# Patient Record
Sex: MALE | Race: WHITE | NOT HISPANIC OR LATINO | Employment: OTHER | URBAN - METROPOLITAN AREA
[De-identification: names, ages, dates, MRNs, and addresses within clinical notes are randomized per-mention and may not be internally consistent; named-entity substitution may affect disease eponyms.]

---

## 2017-06-12 ENCOUNTER — HOSPITAL ENCOUNTER (OUTPATIENT)
Dept: MEDSURG UNIT | Facility: HOSPITAL | Age: 57
End: 2017-06-15
Attending: INTERNAL MEDICINE | Admitting: INTERNAL MEDICINE

## 2017-06-12 LAB
ABS NEUT (OLG): 5.6 X10(3)/MCL (ref 1.5–6.9)
ALBUMIN SERPL-MCNC: 4.2 GM/DL (ref 3.4–5)
ALBUMIN/GLOB SERPL: 1 RATIO
ALP SERPL-CCNC: 92 UNIT/L (ref 30–113)
ALT SERPL-CCNC: 14 UNIT/L (ref 10–45)
APPEARANCE, UA: CLEAR
APTT PPP: 25.7 SECOND(S) (ref 25–35)
AST SERPL-CCNC: 10 UNIT/L (ref 15–37)
BASOPHILS # BLD AUTO: 0.1 X10(3)/MCL (ref 0–0.1)
BASOPHILS NFR BLD AUTO: 1 % (ref 0–1)
BILIRUB SERPL-MCNC: 1.3 MG/DL (ref 0.1–0.9)
BILIRUB UR QL STRIP: NEGATIVE
BILIRUBIN DIRECT+TOT PNL SERPL-MCNC: 0.2 MG/DL (ref 0–0.3)
BILIRUBIN DIRECT+TOT PNL SERPL-MCNC: 1.1 MG/DL
BUN SERPL-MCNC: 30 MG/DL (ref 10–20)
CALCIUM SERPL-MCNC: 9.9 MG/DL (ref 8–10.5)
CHLORIDE SERPL-SCNC: 101 MMOL/L (ref 100–108)
CK MB SERPL-MCNC: 0.7 NG/ML (ref 0–3.6)
CK MB SERPL-MCNC: 0.7 NG/ML (ref 0–3.6)
CK MB SERPL-MCNC: 1.2 NG/ML (ref 0–3.6)
CK SERPL-CCNC: 33 UNIT/L (ref 39–225)
CK SERPL-CCNC: 50 UNIT/L (ref 39–225)
CK SERPL-CCNC: 63 UNIT/L (ref 39–225)
CO2 SERPL-SCNC: 30 MMOL/L (ref 21–35)
COLOR UR: NORMAL
CREAT SERPL-MCNC: 1.24 MG/DL (ref 0.7–1.3)
EOSINOPHIL # BLD AUTO: 0.1 X10(3)/MCL (ref 0–0.6)
EOSINOPHIL NFR BLD AUTO: 1 % (ref 0–5)
ERYTHROCYTE [DISTWIDTH] IN BLOOD BY AUTOMATED COUNT: 12.5 % (ref 11.5–17)
GLOBULIN SER-MCNC: 4.3 GM/DL
GLUCOSE (UA): NEGATIVE
GLUCOSE SERPL-MCNC: 111 MG/DL (ref 75–116)
HCT VFR BLD AUTO: 44.2 % (ref 42–52)
HGB BLD-MCNC: 15.4 GM/DL (ref 14–18)
HGB UR QL STRIP: NEGATIVE
INR PPP: 1 (ref 0–1.2)
KETONES UR QL STRIP: NEGATIVE
LEUKOCYTE ESTERASE UR QL STRIP: NEGATIVE
LYMPHOCYTES # BLD AUTO: 1.5 X10(3)/MCL (ref 0.5–4.1)
LYMPHOCYTES NFR BLD AUTO: 19.4 % (ref 15–40)
MCH RBC QN AUTO: 31 PG (ref 27–34)
MCHC RBC AUTO-ENTMCNC: 35 GM/DL (ref 31–36)
MCV RBC AUTO: 90 FL (ref 80–99)
MONOCYTES # BLD AUTO: 0.4 X10(3)/MCL (ref 0–1.1)
MONOCYTES NFR BLD AUTO: 5 % (ref 4–12)
NEUTROPHILS # BLD AUTO: 5.6 X10(3)/MCL (ref 1.5–6.9)
NEUTROPHILS NFR BLD AUTO: 73 % (ref 43–75)
NITRITE UR QL STRIP: NEGATIVE
PH UR STRIP: 5.5 [PH]
PLATELET # BLD AUTO: 270 X10(3)/MCL (ref 140–400)
PMV BLD AUTO: 10.3 FL (ref 6.8–10)
POTASSIUM SERPL-SCNC: 4.6 MMOL/L (ref 3.6–5.2)
PROT SERPL-MCNC: 8.5 GM/DL (ref 6.4–8.2)
PROT UR QL STRIP: NEGATIVE
PROTHROMBIN TIME: 10.7 SECOND(S) (ref 9–12)
RBC # BLD AUTO: 4.9 X10(6)/MCL (ref 4.7–6.1)
SODIUM SERPL-SCNC: 139 MMOL/L (ref 135–145)
SP GR UR STRIP: >=1.03
TROPONIN I SERPL-MCNC: <0.017 NG/ML (ref 0–0.06)
TSH SERPL-ACNC: 6.85 MIU/ML (ref 0.36–3.74)
UROBILINOGEN UR STRIP-ACNC: 0.2 EU/DL
WBC # SPEC AUTO: 7.7 X10(3)/MCL (ref 4.5–11.5)

## 2017-06-13 LAB
ABS NEUT (OLG): 3.5 X10(3)/MCL (ref 1.5–6.9)
ALBUMIN SERPL-MCNC: 3.1 GM/DL (ref 3.4–5)
ALBUMIN/GLOB SERPL: 0.9 RATIO
ALP SERPL-CCNC: 93 UNIT/L (ref 30–113)
ALT SERPL-CCNC: 17 UNIT/L (ref 10–45)
AST SERPL-CCNC: 14 UNIT/L (ref 15–37)
BASOPHILS # BLD AUTO: 0 X10(3)/MCL (ref 0–0.1)
BASOPHILS NFR BLD AUTO: 1 % (ref 0–1)
BILIRUB SERPL-MCNC: 1.4 MG/DL (ref 0.1–0.9)
BILIRUBIN DIRECT+TOT PNL SERPL-MCNC: 0.3 MG/DL (ref 0–0.3)
BILIRUBIN DIRECT+TOT PNL SERPL-MCNC: 1.1 MG/DL
BUN SERPL-MCNC: 22 MG/DL (ref 10–20)
CALCIUM SERPL-MCNC: 7.9 MG/DL (ref 8–10.5)
CHLORIDE SERPL-SCNC: 108 MMOL/L (ref 100–108)
CK MB SERPL-MCNC: <0.5 NG/ML (ref 0–3.6)
CK SERPL-CCNC: 29 UNIT/L (ref 39–225)
CO2 SERPL-SCNC: 28 MMOL/L (ref 21–35)
CREAT SERPL-MCNC: 0.77 MG/DL (ref 0.7–1.3)
EOSINOPHIL # BLD AUTO: 0.1 X10(3)/MCL (ref 0–0.6)
EOSINOPHIL NFR BLD AUTO: 2 % (ref 0–5)
ERYTHROCYTE [DISTWIDTH] IN BLOOD BY AUTOMATED COUNT: 12.4 % (ref 11.5–17)
GLOBULIN SER-MCNC: 3.3 GM/DL
GLUCOSE SERPL-MCNC: 86 MG/DL (ref 75–116)
HCT VFR BLD AUTO: 37.7 % (ref 42–52)
HGB BLD-MCNC: 12.9 GM/DL (ref 14–18)
LYMPHOCYTES # BLD AUTO: 1 X10(3)/MCL (ref 0.5–4.1)
LYMPHOCYTES NFR BLD AUTO: 20.5 % (ref 15–40)
MAGNESIUM SERPL-MCNC: 1.5 MG/DL (ref 1.8–2.4)
MCH RBC QN AUTO: 31 PG (ref 27–34)
MCHC RBC AUTO-ENTMCNC: 34 GM/DL (ref 31–36)
MCV RBC AUTO: 91 FL (ref 80–99)
MONOCYTES # BLD AUTO: 0.3 X10(3)/MCL (ref 0–1.1)
MONOCYTES NFR BLD AUTO: 6 % (ref 4–12)
NEUTROPHILS # BLD AUTO: 3.5 X10(3)/MCL (ref 1.5–6.9)
NEUTROPHILS NFR BLD AUTO: 70 % (ref 43–75)
PHOSPHATE SERPL-MCNC: 2.5 MG/DL (ref 2.6–4.7)
PLATELET # BLD AUTO: 181 X10(3)/MCL (ref 140–400)
PMV BLD AUTO: 10.2 FL (ref 6.8–10)
POTASSIUM SERPL-SCNC: 3.8 MMOL/L (ref 3.6–5.2)
PROT SERPL-MCNC: 6.4 GM/DL (ref 6.4–8.2)
RBC # BLD AUTO: 4.14 X10(6)/MCL (ref 4.7–6.1)
SODIUM SERPL-SCNC: 141 MMOL/L (ref 135–145)
TROPONIN I SERPL-MCNC: <0.017 NG/ML (ref 0–0.06)
WBC # SPEC AUTO: 5 X10(3)/MCL (ref 4.5–11.5)

## 2017-06-14 LAB
ABS NEUT (OLG): 3.9 X10(3)/MCL (ref 1.5–6.9)
ALBUMIN SERPL-MCNC: 3.1 GM/DL (ref 3.4–5)
ALBUMIN/GLOB SERPL: 0.9 RATIO
ALP SERPL-CCNC: 85 UNIT/L (ref 30–113)
ALT SERPL-CCNC: 12 UNIT/L (ref 10–45)
AST SERPL-CCNC: 11 UNIT/L (ref 15–37)
BASOPHILS # BLD AUTO: 0.1 X10(3)/MCL (ref 0–0.1)
BASOPHILS NFR BLD AUTO: 1 % (ref 0–1)
BILIRUB SERPL-MCNC: 1.7 MG/DL (ref 0.1–0.9)
BILIRUBIN DIRECT+TOT PNL SERPL-MCNC: 0.3 MG/DL (ref 0–0.3)
BILIRUBIN DIRECT+TOT PNL SERPL-MCNC: 1.4 MG/DL
BUN SERPL-MCNC: 12 MG/DL (ref 10–20)
CALCIUM SERPL-MCNC: 8.2 MG/DL (ref 8–10.5)
CHLORIDE SERPL-SCNC: 105 MMOL/L (ref 100–108)
CO2 SERPL-SCNC: 31 MMOL/L (ref 21–35)
CREAT SERPL-MCNC: 0.74 MG/DL (ref 0.7–1.3)
EOSINOPHIL # BLD AUTO: 0.1 X10(3)/MCL (ref 0–0.6)
EOSINOPHIL NFR BLD AUTO: 1 % (ref 0–5)
ERYTHROCYTE [DISTWIDTH] IN BLOOD BY AUTOMATED COUNT: 12.1 % (ref 11.5–17)
GLOBULIN SER-MCNC: 3.3 GM/DL
GLUCOSE SERPL-MCNC: 112 MG/DL (ref 75–116)
HCT VFR BLD AUTO: 37.8 % (ref 42–52)
HGB BLD-MCNC: 13.2 GM/DL (ref 14–18)
LYMPHOCYTES # BLD AUTO: 1 X10(3)/MCL (ref 0.5–4.1)
LYMPHOCYTES NFR BLD AUTO: 19.2 % (ref 15–40)
MAGNESIUM SERPL-MCNC: 1.3 MG/DL (ref 1.8–2.4)
MCH RBC QN AUTO: 31 PG (ref 27–34)
MCHC RBC AUTO-ENTMCNC: 35 GM/DL (ref 31–36)
MCV RBC AUTO: 89 FL (ref 80–99)
MONOCYTES # BLD AUTO: 0.3 X10(3)/MCL (ref 0–1.1)
MONOCYTES NFR BLD AUTO: 5 % (ref 4–12)
NEUTROPHILS # BLD AUTO: 3.9 X10(3)/MCL (ref 1.5–6.9)
NEUTROPHILS NFR BLD AUTO: 74 % (ref 43–75)
PHOSPHATE SERPL-MCNC: 2.7 MG/DL (ref 2.6–4.7)
PLATELET # BLD AUTO: 184 X10(3)/MCL (ref 140–400)
PMV BLD AUTO: 10.1 FL (ref 6.8–10)
POTASSIUM SERPL-SCNC: 3.8 MMOL/L (ref 3.6–5.2)
PROT SERPL-MCNC: 6.4 GM/DL (ref 6.4–8.2)
RBC # BLD AUTO: 4.24 X10(6)/MCL (ref 4.7–6.1)
SODIUM SERPL-SCNC: 139 MMOL/L (ref 135–145)
WBC # SPEC AUTO: 5.4 X10(3)/MCL (ref 4.5–11.5)

## 2017-06-15 LAB
ABS NEUT (OLG): 3.4 X10(3)/MCL (ref 1.5–6.9)
ALBUMIN SERPL-MCNC: 3 GM/DL (ref 3.4–5)
ALBUMIN/GLOB SERPL: 0.9 RATIO
ALP SERPL-CCNC: 80 UNIT/L (ref 30–113)
ALT SERPL-CCNC: 12 UNIT/L (ref 10–45)
AST SERPL-CCNC: 11 UNIT/L (ref 15–37)
BASOPHILS # BLD AUTO: 0.1 X10(3)/MCL (ref 0–0.1)
BASOPHILS NFR BLD AUTO: 1 % (ref 0–1)
BILIRUB SERPL-MCNC: 1.2 MG/DL (ref 0.1–0.9)
BILIRUBIN DIRECT+TOT PNL SERPL-MCNC: 0.2 MG/DL (ref 0–0.3)
BILIRUBIN DIRECT+TOT PNL SERPL-MCNC: 1 MG/DL
BUN SERPL-MCNC: 11 MG/DL (ref 10–20)
CALCIUM SERPL-MCNC: 8 MG/DL (ref 8–10.5)
CHLORIDE SERPL-SCNC: 106 MMOL/L (ref 100–108)
CO2 SERPL-SCNC: 27 MMOL/L (ref 21–35)
CREAT SERPL-MCNC: 0.65 MG/DL (ref 0.7–1.3)
EOSINOPHIL # BLD AUTO: 0.1 X10(3)/MCL (ref 0–0.6)
EOSINOPHIL NFR BLD AUTO: 2 % (ref 0–5)
ERYTHROCYTE [DISTWIDTH] IN BLOOD BY AUTOMATED COUNT: 12.1 % (ref 11.5–17)
GLOBULIN SER-MCNC: 3.4 GM/DL
GLUCOSE SERPL-MCNC: 91 MG/DL (ref 75–116)
HCT VFR BLD AUTO: 36.5 % (ref 42–52)
HGB BLD-MCNC: 12.8 GM/DL (ref 14–18)
LYMPHOCYTES # BLD AUTO: 1 X10(3)/MCL (ref 0.5–4.1)
LYMPHOCYTES NFR BLD AUTO: 21.1 % (ref 15–40)
MAGNESIUM SERPL-MCNC: 1.7 MG/DL (ref 1.8–2.4)
MCH RBC QN AUTO: 31 PG (ref 27–34)
MCHC RBC AUTO-ENTMCNC: 35 GM/DL (ref 31–36)
MCV RBC AUTO: 89 FL (ref 80–99)
MONOCYTES # BLD AUTO: 0.3 X10(3)/MCL (ref 0–1.1)
MONOCYTES NFR BLD AUTO: 6 % (ref 4–12)
NEUTROPHILS # BLD AUTO: 3.4 X10(3)/MCL (ref 1.5–6.9)
NEUTROPHILS NFR BLD AUTO: 69 % (ref 43–75)
PHOSPHATE SERPL-MCNC: 2.3 MG/DL (ref 2.6–4.7)
PLATELET # BLD AUTO: 173 X10(3)/MCL (ref 140–400)
PMV BLD AUTO: 10.1 FL (ref 6.8–10)
POTASSIUM SERPL-SCNC: 3.3 MMOL/L (ref 3.6–5.2)
PROT SERPL-MCNC: 6.4 GM/DL (ref 6.4–8.2)
RBC # BLD AUTO: 4.11 X10(6)/MCL (ref 4.7–6.1)
SODIUM SERPL-SCNC: 140 MMOL/L (ref 135–145)
WBC # SPEC AUTO: 5 X10(3)/MCL (ref 4.5–11.5)

## 2017-09-18 ENCOUNTER — HISTORICAL (OUTPATIENT)
Dept: RADIOLOGY | Facility: HOSPITAL | Age: 57
End: 2017-09-18

## 2019-07-29 ENCOUNTER — HISTORICAL (OUTPATIENT)
Dept: RADIOLOGY | Facility: HOSPITAL | Age: 59
End: 2019-07-29

## 2019-08-23 LAB
ABS NEUT (OLG): 4.1 X10(3)/MCL (ref 1.5–6.9)
ALBUMIN SERPL-MCNC: 4 GM/DL (ref 3.4–5)
ALBUMIN/GLOB SERPL: 0.9 RATIO
ALP SERPL-CCNC: 88 UNIT/L (ref 30–113)
ALT SERPL-CCNC: 11 UNIT/L (ref 10–45)
APTT PPP: 27.5 SECOND(S) (ref 25–35)
AST SERPL-CCNC: 12 UNIT/L (ref 15–37)
BILIRUB SERPL-MCNC: 1.9 MG/DL (ref 0.1–0.9)
BILIRUBIN DIRECT+TOT PNL SERPL-MCNC: 0.3 MG/DL (ref 0–0.3)
BILIRUBIN DIRECT+TOT PNL SERPL-MCNC: 1.6 MG/DL
BUN SERPL-MCNC: 13 MG/DL (ref 10–20)
CALCIUM SERPL-MCNC: 8.9 MG/DL (ref 8–10.5)
CHLORIDE SERPL-SCNC: 102 MMOL/L (ref 100–108)
CO2 SERPL-SCNC: 29 MMOL/L (ref 21–35)
CREAT SERPL-MCNC: 1.14 MG/DL (ref 0.7–1.3)
ERYTHROCYTE [DISTWIDTH] IN BLOOD BY AUTOMATED COUNT: 12.3 % (ref 11.5–17)
GLOBULIN SER-MCNC: 4.4 GM/DL
GLUCOSE SERPL-MCNC: 89 MG/DL (ref 75–116)
HCT VFR BLD AUTO: 46.6 % (ref 42–52)
HGB BLD-MCNC: 15.4 GM/DL (ref 14–18)
INR PPP: 1 (ref 0–1.2)
MCH RBC QN AUTO: 31 PG (ref 27–34)
MCHC RBC AUTO-ENTMCNC: 33 GM/DL (ref 31–36)
MCV RBC AUTO: 94 FL (ref 80–99)
PLATELET # BLD AUTO: 185 X10(3)/MCL (ref 140–400)
PMV BLD AUTO: 9.9 FL (ref 6.8–10)
POTASSIUM SERPL-SCNC: 3.9 MMOL/L (ref 3.6–5.2)
PROT SERPL-MCNC: 8.4 GM/DL (ref 6.4–8.2)
PROTHROMBIN TIME: 10.2 SECOND(S) (ref 9–12)
RBC # BLD AUTO: 4.96 X10(6)/MCL (ref 4.7–6.1)
SODIUM SERPL-SCNC: 139 MMOL/L (ref 135–145)
WBC # SPEC AUTO: 5.9 X10(3)/MCL (ref 4.5–11.5)

## 2019-08-29 ENCOUNTER — HISTORICAL (OUTPATIENT)
Dept: ANESTHESIOLOGY | Facility: HOSPITAL | Age: 59
End: 2019-08-29

## 2019-08-29 LAB
APPEARANCE, UA: CLEAR
BACTERIA SPEC CULT: ABNORMAL /HPF
BILIRUB UR QL STRIP: NEGATIVE
COLOR UR: ABNORMAL
GLUCOSE (UA): NEGATIVE
HGB UR QL STRIP: ABNORMAL
KETONES UR QL STRIP: NEGATIVE
LEUKOCYTE ESTERASE UR QL STRIP: NEGATIVE
NITRITE UR QL STRIP: NEGATIVE
PH UR STRIP: 7.5 [PH]
PROT UR QL STRIP: NEGATIVE
RBC #/AREA URNS HPF: ABNORMAL /HPF
SP GR UR STRIP: 1.01
SQUAMOUS EPITHELIAL, UA: ABNORMAL /LPF
UROBILINOGEN UR STRIP-ACNC: 0.2 EU/DL
WBC #/AREA URNS HPF: ABNORMAL /HPF

## 2019-09-04 ENCOUNTER — HISTORICAL (OUTPATIENT)
Dept: RADIOLOGY | Facility: HOSPITAL | Age: 59
End: 2019-09-04

## 2019-09-11 ENCOUNTER — HISTORICAL (OUTPATIENT)
Dept: LAB | Facility: HOSPITAL | Age: 59
End: 2019-09-11

## 2019-09-11 LAB
ABS NEUT (OLG): 3.9 X10(3)/MCL (ref 1.5–6.9)
BASOPHILS # BLD AUTO: 0.1 X10(3)/MCL (ref 0–0.1)
BASOPHILS NFR BLD AUTO: 1 % (ref 0–1)
EOSINOPHIL # BLD AUTO: 0.2 X10(3)/MCL (ref 0–0.6)
EOSINOPHIL NFR BLD AUTO: 3 % (ref 0–5)
ERYTHROCYTE [DISTWIDTH] IN BLOOD BY AUTOMATED COUNT: 12.1 % (ref 11.5–17)
HCT VFR BLD AUTO: 40.3 % (ref 42–52)
HCV AB SERPL QL IA: NEGATIVE
HGB BLD-MCNC: 13.5 GM/DL (ref 14–18)
HIV 1+2 AB+HIV1 P24 AG SERPL QL IA: NEGATIVE
IMM GRANULOCYTES # BLD AUTO: 0.01 10*3/UL (ref 0–0.02)
IMM GRANULOCYTES NFR BLD AUTO: 0.2 % (ref 0–0.43)
LYMPHOCYTES # BLD AUTO: 1.2 X10(3)/MCL (ref 0.5–4.1)
LYMPHOCYTES NFR BLD AUTO: 21 % (ref 15–40)
MCH RBC QN AUTO: 31 PG (ref 27–34)
MCHC RBC AUTO-ENTMCNC: 34 GM/DL (ref 31–36)
MCV RBC AUTO: 93 FL (ref 80–99)
MONOCYTES # BLD AUTO: 0.3 X10(3)/MCL (ref 0–1.1)
MONOCYTES NFR BLD AUTO: 6 % (ref 4–12)
NEUTROPHILS # BLD AUTO: 3.9 X10(3)/MCL (ref 1.5–6.9)
NEUTROPHILS NFR BLD AUTO: 69 % (ref 43–75)
PLATELET # BLD AUTO: 213 X10(3)/MCL (ref 140–400)
PMV BLD AUTO: 9.7 FL (ref 6.8–10)
RBC # BLD AUTO: 4.33 X10(6)/MCL (ref 4.7–6.1)
T PALLIDUM AB SER QL: NORMAL
TSH SERPL-ACNC: 4.89 MIU/ML (ref 0.36–3.74)
WBC # SPEC AUTO: 5.7 X10(3)/MCL (ref 4.5–11.5)

## 2019-09-17 LAB
ABS NEUT (OLG): 3.8 X10(3)/MCL (ref 1.5–6.9)
APTT PPP: 26.2 SECOND(S) (ref 25–35)
BUN SERPL-MCNC: 16 MG/DL (ref 10–20)
CALCIUM SERPL-MCNC: 8.8 MG/DL (ref 8–10.5)
CHLORIDE SERPL-SCNC: 104 MMOL/L (ref 100–108)
CO2 SERPL-SCNC: 28 MMOL/L (ref 21–35)
CREAT SERPL-MCNC: 0.84 MG/DL (ref 0.7–1.3)
CREAT/UREA NIT SERPL: 19
ERYTHROCYTE [DISTWIDTH] IN BLOOD BY AUTOMATED COUNT: 12.2 % (ref 11.5–17)
GLUCOSE SERPL-MCNC: 87 MG/DL (ref 75–116)
HCT VFR BLD AUTO: 41.8 % (ref 42–52)
HGB BLD-MCNC: 13.5 GM/DL (ref 14–18)
INR PPP: 0.9 (ref 0–1.2)
MCH RBC QN AUTO: 31 PG (ref 27–34)
MCHC RBC AUTO-ENTMCNC: 32 GM/DL (ref 31–36)
MCV RBC AUTO: 95 FL (ref 80–99)
PLATELET # BLD AUTO: 211 X10(3)/MCL (ref 140–400)
PMV BLD AUTO: 10 FL (ref 6.8–10)
POTASSIUM SERPL-SCNC: 3.7 MMOL/L (ref 3.6–5.2)
PROTHROMBIN TIME: 9.6 SECOND(S) (ref 9–12)
PSA SERPL-MCNC: 2.71 NG/ML (ref 0–4)
RBC # BLD AUTO: 4.39 X10(6)/MCL (ref 4.7–6.1)
SODIUM SERPL-SCNC: 140 MMOL/L (ref 135–145)
WBC # SPEC AUTO: 5.4 X10(3)/MCL (ref 4.5–11.5)

## 2019-09-19 ENCOUNTER — HISTORICAL (OUTPATIENT)
Dept: ANESTHESIOLOGY | Facility: HOSPITAL | Age: 59
End: 2019-09-19

## 2019-10-08 ENCOUNTER — HISTORICAL (OUTPATIENT)
Dept: RADIOLOGY | Facility: HOSPITAL | Age: 59
End: 2019-10-08

## 2019-10-21 ENCOUNTER — HISTORICAL (OUTPATIENT)
Dept: RADIOLOGY | Facility: HOSPITAL | Age: 59
End: 2019-10-21

## 2019-10-30 ENCOUNTER — HISTORICAL (OUTPATIENT)
Dept: RADIOLOGY | Facility: HOSPITAL | Age: 59
End: 2019-10-30

## 2020-01-16 ENCOUNTER — HISTORICAL (OUTPATIENT)
Dept: LAB | Facility: HOSPITAL | Age: 60
End: 2020-01-16

## 2020-01-16 LAB
BUN SERPL-MCNC: 15 MG/DL (ref 10–20)
CALCIUM SERPL-MCNC: 9.6 MG/DL (ref 8–10.5)
CHLORIDE SERPL-SCNC: 102 MMOL/L (ref 100–108)
CO2 SERPL-SCNC: 31 MMOL/L (ref 21–35)
CREAT SERPL-MCNC: 1.03 MG/DL (ref 0.7–1.3)
CREAT/UREA NIT SERPL: 15
GLUCOSE SERPL-MCNC: 105 MG/DL (ref 75–116)
POTASSIUM SERPL-SCNC: 4.4 MMOL/L (ref 3.6–5.2)
SODIUM SERPL-SCNC: 139 MMOL/L (ref 135–145)
URATE SERPL-MCNC: 4.7 MG/DL (ref 2.6–7.2)

## 2020-02-20 ENCOUNTER — HISTORICAL (OUTPATIENT)
Dept: RADIOLOGY | Facility: HOSPITAL | Age: 60
End: 2020-02-20

## 2020-04-22 ENCOUNTER — HISTORICAL (OUTPATIENT)
Dept: RADIOLOGY | Facility: HOSPITAL | Age: 60
End: 2020-04-22

## 2020-05-28 ENCOUNTER — HISTORICAL (OUTPATIENT)
Dept: RADIOLOGY | Facility: HOSPITAL | Age: 60
End: 2020-05-28

## 2020-05-28 LAB
BUN SERPL-MCNC: 14 MG/DL (ref 8.4–25.7)
CALCIUM SERPL-MCNC: 9.3 MG/DL (ref 8.4–10.2)
CHLORIDE SERPL-SCNC: 106 MMOL/L (ref 98–107)
CO2 SERPL-SCNC: 29 MMOL/L (ref 22–29)
CREAT SERPL-MCNC: 0.97 MG/DL (ref 0.73–1.18)
CREAT/UREA NIT SERPL: 14
GLUCOSE SERPL-MCNC: 91 MG/DL (ref 74–100)
POTASSIUM SERPL-SCNC: 4.7 MMOL/L (ref 3.5–5.1)
SODIUM SERPL-SCNC: 141 MMOL/L (ref 136–145)
URATE SERPL-MCNC: 6 MG/DL (ref 3.5–7.2)

## 2020-09-22 ENCOUNTER — HISTORICAL (OUTPATIENT)
Dept: RADIOLOGY | Facility: HOSPITAL | Age: 60
End: 2020-09-22

## 2021-03-05 ENCOUNTER — HISTORICAL (OUTPATIENT)
Dept: ADMINISTRATIVE | Facility: HOSPITAL | Age: 61
End: 2021-03-05

## 2021-03-05 LAB
ABS NEUT (OLG): 6.4 X10(3)/MCL (ref 1.5–6.9)
ALBUMIN SERPL-MCNC: 2.9 GM/DL (ref 3.4–4.8)
ALBUMIN/GLOB SERPL: 0.8 RATIO (ref 1.1–2)
ALP SERPL-CCNC: 94 UNIT/L (ref 40–150)
ALT SERPL-CCNC: 20 UNIT/L (ref 0–55)
AST SERPL-CCNC: 20 UNIT/L (ref 5–34)
BASOPHILS # BLD AUTO: 0.1 X10(3)/MCL (ref 0–0.1)
BASOPHILS NFR BLD AUTO: 1 % (ref 0–1)
BILIRUB SERPL-MCNC: 0.4 MG/DL
BILIRUBIN DIRECT+TOT PNL SERPL-MCNC: 0.2 MG/DL (ref 0–0.5)
BILIRUBIN DIRECT+TOT PNL SERPL-MCNC: 0.2 MG/DL (ref 0–0.8)
BUN SERPL-MCNC: 32 MG/DL (ref 8.4–25.7)
CALCIUM SERPL-MCNC: 9.4 MG/DL (ref 8.8–10)
CHLORIDE SERPL-SCNC: 103 MMOL/L (ref 98–107)
CO2 SERPL-SCNC: 28 MMOL/L (ref 23–31)
CREAT SERPL-MCNC: 0.65 MG/DL (ref 0.73–1.18)
EOSINOPHIL # BLD AUTO: 0.4 X10(3)/MCL (ref 0–0.6)
EOSINOPHIL NFR BLD AUTO: 5 % (ref 0–5)
ERYTHROCYTE [DISTWIDTH] IN BLOOD BY AUTOMATED COUNT: 12.9 % (ref 11.5–17)
GLOBULIN SER-MCNC: 3.8 GM/DL (ref 2.4–3.5)
GLUCOSE SERPL-MCNC: 71 MG/DL (ref 82–115)
HCT VFR BLD AUTO: 35.7 % (ref 42–52)
HGB BLD-MCNC: 12.2 GM/DL (ref 14–18)
IMM GRANULOCYTES # BLD AUTO: 0.02 10*3/UL (ref 0–0.02)
IMM GRANULOCYTES NFR BLD AUTO: 0.2 % (ref 0–0.43)
LYMPHOCYTES # BLD AUTO: 1.2 X10(3)/MCL (ref 0.5–4.1)
LYMPHOCYTES NFR BLD AUTO: 14 % (ref 15–40)
MAGNESIUM SERPL-MCNC: 2 MG/DL (ref 1.6–2.6)
MCH RBC QN AUTO: 31 PG (ref 27–34)
MCHC RBC AUTO-ENTMCNC: 34 GM/DL (ref 31–36)
MCV RBC AUTO: 91 FL (ref 80–99)
MONOCYTES # BLD AUTO: 0.4 X10(3)/MCL (ref 0–1.1)
MONOCYTES NFR BLD AUTO: 5 % (ref 4–12)
NEUTROPHILS # BLD AUTO: 6.4 X10(3)/MCL (ref 1.5–6.9)
NEUTROPHILS NFR BLD AUTO: 75 % (ref 43–75)
PHOSPHATE SERPL-MCNC: 2.7 MG/DL (ref 2.3–4.7)
PLATELET # BLD AUTO: 301 X10(3)/MCL (ref 140–400)
PMV BLD AUTO: 9.6 FL (ref 6.8–10)
POTASSIUM SERPL-SCNC: 3.3 MMOL/L (ref 3.5–5.1)
PREALB SERPL-MCNC: 25.2 MG/DL (ref 16–42)
PROT SERPL-MCNC: 6.7 GM/DL (ref 5.8–7.6)
RBC # BLD AUTO: 3.91 X10(6)/MCL (ref 4.7–6.1)
SODIUM SERPL-SCNC: 137 MMOL/L (ref 136–145)
TSH SERPL-ACNC: 16.36 UIU/ML (ref 0.35–4.94)
WBC # SPEC AUTO: 8.6 X10(3)/MCL (ref 4.5–11.5)

## 2021-03-07 LAB
ABS NEUT (OLG): 3.6 X10(3)/MCL (ref 1.5–6.9)
ALBUMIN SERPL-MCNC: 3.1 GM/DL (ref 3.4–4.8)
ALBUMIN/GLOB SERPL: 0.7 RATIO (ref 1.1–2)
ALP SERPL-CCNC: 109 UNIT/L (ref 40–150)
ALT SERPL-CCNC: 21 UNIT/L (ref 0–55)
AST SERPL-CCNC: 21 UNIT/L (ref 5–34)
BASOPHILS # BLD AUTO: 0 X10(3)/MCL (ref 0–0.1)
BASOPHILS NFR BLD AUTO: 1 % (ref 0–1)
BILIRUB SERPL-MCNC: 0.5 MG/DL
BILIRUBIN DIRECT+TOT PNL SERPL-MCNC: 0.2 MG/DL (ref 0–0.5)
BILIRUBIN DIRECT+TOT PNL SERPL-MCNC: 0.3 MG/DL (ref 0–0.8)
BUN SERPL-MCNC: 23 MG/DL (ref 8.4–25.7)
CALCIUM SERPL-MCNC: 9.8 MG/DL (ref 8.8–10)
CHLORIDE SERPL-SCNC: 104 MMOL/L (ref 98–107)
CO2 SERPL-SCNC: 29 MMOL/L (ref 23–31)
CREAT SERPL-MCNC: 0.66 MG/DL (ref 0.73–1.18)
EOSINOPHIL # BLD AUTO: 0.3 X10(3)/MCL (ref 0–0.6)
EOSINOPHIL NFR BLD AUTO: 6 % (ref 0–5)
ERYTHROCYTE [DISTWIDTH] IN BLOOD BY AUTOMATED COUNT: 13.1 % (ref 11.5–17)
GLOBULIN SER-MCNC: 4.2 GM/DL (ref 2.4–3.5)
GLUCOSE SERPL-MCNC: 83 MG/DL (ref 82–115)
HCT VFR BLD AUTO: 36.4 % (ref 42–52)
HGB BLD-MCNC: 12.5 GM/DL (ref 14–18)
IMM GRANULOCYTES # BLD AUTO: 0.02 10*3/UL (ref 0–0.02)
IMM GRANULOCYTES NFR BLD AUTO: 0.4 % (ref 0–0.43)
LYMPHOCYTES # BLD AUTO: 1 X10(3)/MCL (ref 0.5–4.1)
LYMPHOCYTES NFR BLD AUTO: 19 % (ref 15–40)
MAGNESIUM SERPL-MCNC: 2 MG/DL (ref 1.6–2.6)
MCH RBC QN AUTO: 31 PG (ref 27–34)
MCHC RBC AUTO-ENTMCNC: 34 GM/DL (ref 31–36)
MCV RBC AUTO: 92 FL (ref 80–99)
MONOCYTES # BLD AUTO: 0.3 X10(3)/MCL (ref 0–1.1)
MONOCYTES NFR BLD AUTO: 6 % (ref 4–12)
NEUTROPHILS # BLD AUTO: 3.6 X10(3)/MCL (ref 1.5–6.9)
NEUTROPHILS NFR BLD AUTO: 68 % (ref 43–75)
PHOSPHATE SERPL-MCNC: 3.3 MG/DL (ref 2.3–4.7)
PLATELET # BLD AUTO: 306 X10(3)/MCL (ref 140–400)
PMV BLD AUTO: 9.8 FL (ref 6.8–10)
POTASSIUM SERPL-SCNC: 3.5 MMOL/L (ref 3.5–5.1)
PROT SERPL-MCNC: 7.3 GM/DL (ref 5.8–7.6)
RBC # BLD AUTO: 3.98 X10(6)/MCL (ref 4.7–6.1)
SODIUM SERPL-SCNC: 142 MMOL/L (ref 136–145)
WBC # SPEC AUTO: 5.4 X10(3)/MCL (ref 4.5–11.5)

## 2021-03-10 LAB
ABS NEUT (OLG): 5.8 X10(3)/MCL (ref 1.5–6.9)
ALBUMIN SERPL-MCNC: 2.9 GM/DL (ref 3.4–4.8)
ALBUMIN/GLOB SERPL: 0.8 RATIO (ref 1.1–2)
ALP SERPL-CCNC: 102 UNIT/L (ref 40–150)
ALT SERPL-CCNC: 16 UNIT/L (ref 0–55)
APTT PPP: 32.7 SEC (ref 23.4–34.9)
AST SERPL-CCNC: 18 UNIT/L (ref 5–34)
BASOPHILS # BLD AUTO: 0 X10(3)/MCL (ref 0–0.1)
BASOPHILS NFR BLD AUTO: 1 % (ref 0–1)
BILIRUB SERPL-MCNC: 0.3 MG/DL
BILIRUBIN DIRECT+TOT PNL SERPL-MCNC: 0.1 MG/DL (ref 0–0.8)
BILIRUBIN DIRECT+TOT PNL SERPL-MCNC: 0.2 MG/DL (ref 0–0.5)
BUN SERPL-MCNC: 27 MG/DL (ref 8.4–25.7)
CALCIUM SERPL-MCNC: 9.6 MG/DL (ref 8.8–10)
CHLORIDE SERPL-SCNC: 100 MMOL/L (ref 98–107)
CO2 SERPL-SCNC: 35 MMOL/L (ref 23–31)
CREAT SERPL-MCNC: 0.69 MG/DL (ref 0.73–1.18)
EOSINOPHIL # BLD AUTO: 0.2 X10(3)/MCL (ref 0–0.6)
EOSINOPHIL NFR BLD AUTO: 3 % (ref 0–5)
ERYTHROCYTE [DISTWIDTH] IN BLOOD BY AUTOMATED COUNT: 12.9 % (ref 11.5–17)
GLOBULIN SER-MCNC: 3.7 GM/DL (ref 2.4–3.5)
GLUCOSE SERPL-MCNC: 85 MG/DL (ref 82–115)
HCT VFR BLD AUTO: 34.1 % (ref 42–52)
HGB BLD-MCNC: 11.2 GM/DL (ref 14–18)
IMM GRANULOCYTES # BLD AUTO: 0.03 10*3/UL (ref 0–0.02)
IMM GRANULOCYTES NFR BLD AUTO: 0.4 % (ref 0–0.43)
INR PPP: 1 (ref 2–3)
LYMPHOCYTES # BLD AUTO: 0.9 X10(3)/MCL (ref 0.5–4.1)
LYMPHOCYTES NFR BLD AUTO: 12 % (ref 15–40)
MAGNESIUM SERPL-MCNC: 1.8 MG/DL (ref 1.6–2.6)
MCH RBC QN AUTO: 31 PG (ref 27–34)
MCHC RBC AUTO-ENTMCNC: 33 GM/DL (ref 31–36)
MCV RBC AUTO: 95 FL (ref 80–99)
MONOCYTES # BLD AUTO: 0.4 X10(3)/MCL (ref 0–1.1)
MONOCYTES NFR BLD AUTO: 6 % (ref 4–12)
NEUTROPHILS # BLD AUTO: 5.8 X10(3)/MCL (ref 1.5–6.9)
NEUTROPHILS NFR BLD AUTO: 78 % (ref 43–75)
PHOSPHATE SERPL-MCNC: 3.5 MG/DL (ref 2.3–4.7)
PLATELET # BLD AUTO: 293 X10(3)/MCL (ref 140–400)
PMV BLD AUTO: 9.8 FL (ref 6.8–10)
POTASSIUM SERPL-SCNC: 4.1 MMOL/L (ref 3.5–5.1)
PROT SERPL-MCNC: 6.6 GM/DL (ref 5.8–7.6)
PROTHROMBIN TIME: 12.8 SEC (ref 11.7–14.5)
RBC # BLD AUTO: 3.59 X10(6)/MCL (ref 4.7–6.1)
SODIUM SERPL-SCNC: 141 MMOL/L (ref 136–145)
WBC # SPEC AUTO: 7.5 X10(3)/MCL (ref 4.5–11.5)

## 2021-03-15 LAB
ABS NEUT (OLG): 8.8 X10(3)/MCL (ref 1.5–6.9)
ALBUMIN SERPL-MCNC: 3.3 GM/DL (ref 3.4–4.8)
ALBUMIN/GLOB SERPL: 0.8 RATIO (ref 1.1–2)
ALP SERPL-CCNC: 109 UNIT/L (ref 40–150)
ALT SERPL-CCNC: 49 UNIT/L (ref 0–55)
AST SERPL-CCNC: 34 UNIT/L (ref 5–34)
BASOPHILS # BLD AUTO: 0.1 X10(3)/MCL (ref 0–0.1)
BASOPHILS NFR BLD AUTO: 1 % (ref 0–1)
BILIRUB SERPL-MCNC: 0.3 MG/DL
BILIRUBIN DIRECT+TOT PNL SERPL-MCNC: 0.1 MG/DL (ref 0–0.5)
BILIRUBIN DIRECT+TOT PNL SERPL-MCNC: 0.2 MG/DL (ref 0–0.8)
BUN SERPL-MCNC: 22 MG/DL (ref 8.4–25.7)
CALCIUM SERPL-MCNC: 9.5 MG/DL (ref 8.8–10)
CHLORIDE SERPL-SCNC: 101 MMOL/L (ref 98–107)
CO2 SERPL-SCNC: 27 MMOL/L (ref 23–31)
CREAT SERPL-MCNC: 0.72 MG/DL (ref 0.73–1.18)
EOSINOPHIL # BLD AUTO: 0.4 X10(3)/MCL (ref 0–0.6)
EOSINOPHIL NFR BLD AUTO: 4 % (ref 0–5)
ERYTHROCYTE [DISTWIDTH] IN BLOOD BY AUTOMATED COUNT: 12.8 % (ref 11.5–17)
GLOBULIN SER-MCNC: 4.1 GM/DL (ref 2.4–3.5)
GLUCOSE SERPL-MCNC: 96 MG/DL (ref 82–115)
HCT VFR BLD AUTO: 37.6 % (ref 42–52)
HGB BLD-MCNC: 12.3 GM/DL (ref 14–18)
IMM GRANULOCYTES # BLD AUTO: 0.09 10*3/UL (ref 0–0.02)
IMM GRANULOCYTES NFR BLD AUTO: 0.7 % (ref 0–0.43)
LYMPHOCYTES # BLD AUTO: 2.2 X10(3)/MCL (ref 0.5–4.1)
LYMPHOCYTES NFR BLD AUTO: 18 % (ref 15–40)
MCH RBC QN AUTO: 31 PG (ref 27–34)
MCHC RBC AUTO-ENTMCNC: 33 GM/DL (ref 31–36)
MCV RBC AUTO: 95 FL (ref 80–99)
MONOCYTES # BLD AUTO: 0.8 X10(3)/MCL (ref 0–1.1)
MONOCYTES NFR BLD AUTO: 6 % (ref 4–12)
NEUTROPHILS # BLD AUTO: 8.8 X10(3)/MCL (ref 1.5–6.9)
NEUTROPHILS NFR BLD AUTO: 71 % (ref 43–75)
PLATELET # BLD AUTO: 325 X10(3)/MCL (ref 140–400)
PMV BLD AUTO: 9.7 FL (ref 6.8–10)
POTASSIUM SERPL-SCNC: 4.2 MMOL/L (ref 3.5–5.1)
PROT SERPL-MCNC: 7.4 GM/DL (ref 5.8–7.6)
RBC # BLD AUTO: 3.95 X10(6)/MCL (ref 4.7–6.1)
SODIUM SERPL-SCNC: 137 MMOL/L (ref 136–145)
WBC # SPEC AUTO: 12.4 X10(3)/MCL (ref 4.5–11.5)

## 2021-03-16 LAB
ABS NEUT (OLG): 5.7 X10(3)/MCL (ref 1.5–6.9)
ALBUMIN SERPL-MCNC: 2.9 GM/DL (ref 3.4–4.8)
ALBUMIN/GLOB SERPL: 0.8 RATIO (ref 1.1–2)
ALP SERPL-CCNC: 101 UNIT/L (ref 40–150)
ALT SERPL-CCNC: 44 UNIT/L (ref 0–55)
AST SERPL-CCNC: 28 UNIT/L (ref 5–34)
BASOPHILS # BLD AUTO: 0.1 X10(3)/MCL (ref 0–0.1)
BASOPHILS NFR BLD AUTO: 1 % (ref 0–1)
BILIRUB SERPL-MCNC: 0.3 MG/DL
BILIRUBIN DIRECT+TOT PNL SERPL-MCNC: 0.1 MG/DL (ref 0–0.5)
BILIRUBIN DIRECT+TOT PNL SERPL-MCNC: 0.2 MG/DL (ref 0–0.8)
BUN SERPL-MCNC: 25 MG/DL (ref 8.4–25.7)
CALCIUM SERPL-MCNC: 9 MG/DL (ref 8.8–10)
CHLORIDE SERPL-SCNC: 103 MMOL/L (ref 98–107)
CO2 SERPL-SCNC: 28 MMOL/L (ref 23–31)
CREAT SERPL-MCNC: 0.7 MG/DL (ref 0.73–1.18)
EOSINOPHIL # BLD AUTO: 0.4 X10(3)/MCL (ref 0–0.6)
EOSINOPHIL NFR BLD AUTO: 5 % (ref 0–5)
ERYTHROCYTE [DISTWIDTH] IN BLOOD BY AUTOMATED COUNT: 13.1 % (ref 11.5–17)
GLOBULIN SER-MCNC: 3.5 GM/DL (ref 2.4–3.5)
GLUCOSE SERPL-MCNC: 92 MG/DL (ref 82–115)
HCT VFR BLD AUTO: 31.7 % (ref 42–52)
HGB BLD-MCNC: 10.4 GM/DL (ref 14–18)
IMM GRANULOCYTES # BLD AUTO: 0.05 10*3/UL (ref 0–0.02)
IMM GRANULOCYTES NFR BLD AUTO: 0.6 % (ref 0–0.43)
LYMPHOCYTES # BLD AUTO: 1.5 X10(3)/MCL (ref 0.5–4.1)
LYMPHOCYTES NFR BLD AUTO: 18 % (ref 15–40)
MAGNESIUM SERPL-MCNC: 2.2 MG/DL (ref 1.6–2.6)
MCH RBC QN AUTO: 31 PG (ref 27–34)
MCHC RBC AUTO-ENTMCNC: 33 GM/DL (ref 31–36)
MCV RBC AUTO: 96 FL (ref 80–99)
MONOCYTES # BLD AUTO: 0.6 X10(3)/MCL (ref 0–1.1)
MONOCYTES NFR BLD AUTO: 7 % (ref 4–12)
NEUTROPHILS # BLD AUTO: 5.7 X10(3)/MCL (ref 1.5–6.9)
NEUTROPHILS NFR BLD AUTO: 69 % (ref 43–75)
PHOSPHATE SERPL-MCNC: 3.2 MG/DL (ref 2.3–4.7)
PLATELET # BLD AUTO: 274 X10(3)/MCL (ref 140–400)
PMV BLD AUTO: 10.1 FL (ref 6.8–10)
POTASSIUM SERPL-SCNC: 4.2 MMOL/L (ref 3.5–5.1)
PREALB SERPL-MCNC: 25.2 MG/DL (ref 16–42)
PROT SERPL-MCNC: 6.4 GM/DL (ref 5.8–7.6)
RBC # BLD AUTO: 3.31 X10(6)/MCL (ref 4.7–6.1)
SODIUM SERPL-SCNC: 139 MMOL/L (ref 136–145)
WBC # SPEC AUTO: 8.3 X10(3)/MCL (ref 4.5–11.5)

## 2021-03-17 LAB
ABS NEUT (OLG): 5.3 X10(3)/MCL (ref 1.5–6.9)
ALBUMIN SERPL-MCNC: 2.8 GM/DL (ref 3.4–4.8)
ALBUMIN/GLOB SERPL: 0.8 RATIO (ref 1.1–2)
ALP SERPL-CCNC: 97 UNIT/L (ref 40–150)
ALT SERPL-CCNC: 43 UNIT/L (ref 0–55)
AST SERPL-CCNC: 28 UNIT/L (ref 5–34)
BASOPHILS # BLD AUTO: 0.1 X10(3)/MCL (ref 0–0.1)
BASOPHILS NFR BLD AUTO: 1 % (ref 0–1)
BILIRUB SERPL-MCNC: 0.2 MG/DL
BILIRUBIN DIRECT+TOT PNL SERPL-MCNC: 0.1 MG/DL (ref 0–0.5)
BILIRUBIN DIRECT+TOT PNL SERPL-MCNC: 0.1 MG/DL (ref 0–0.8)
BUN SERPL-MCNC: 28 MG/DL (ref 8.4–25.7)
CALCIUM SERPL-MCNC: 8.9 MG/DL (ref 8.8–10)
CHLORIDE SERPL-SCNC: 104 MMOL/L (ref 98–107)
CO2 SERPL-SCNC: 28 MMOL/L (ref 23–31)
CREAT SERPL-MCNC: 0.76 MG/DL (ref 0.73–1.18)
EOSINOPHIL # BLD AUTO: 0.5 X10(3)/MCL (ref 0–0.6)
EOSINOPHIL NFR BLD AUTO: 6 % (ref 0–5)
ERYTHROCYTE [DISTWIDTH] IN BLOOD BY AUTOMATED COUNT: 13.2 % (ref 11.5–17)
GLOBULIN SER-MCNC: 3.5 GM/DL (ref 2.4–3.5)
GLUCOSE SERPL-MCNC: 85 MG/DL (ref 82–115)
HCT VFR BLD AUTO: 30.9 % (ref 42–52)
HGB BLD-MCNC: 10.1 GM/DL (ref 14–18)
IMM GRANULOCYTES # BLD AUTO: 0.08 10*3/UL (ref 0–0.02)
IMM GRANULOCYTES NFR BLD AUTO: 1 % (ref 0–0.43)
LYMPHOCYTES # BLD AUTO: 1.7 X10(3)/MCL (ref 0.5–4.1)
LYMPHOCYTES NFR BLD AUTO: 20 % (ref 15–40)
MAGNESIUM SERPL-MCNC: 2.1 MG/DL (ref 1.6–2.6)
MCH RBC QN AUTO: 32 PG (ref 27–34)
MCHC RBC AUTO-ENTMCNC: 33 GM/DL (ref 31–36)
MCV RBC AUTO: 98 FL (ref 80–99)
MONOCYTES # BLD AUTO: 0.7 X10(3)/MCL (ref 0–1.1)
MONOCYTES NFR BLD AUTO: 8 % (ref 4–12)
NEUTROPHILS # BLD AUTO: 5.3 X10(3)/MCL (ref 1.5–6.9)
NEUTROPHILS NFR BLD AUTO: 64 % (ref 43–75)
PHOSPHATE SERPL-MCNC: 3.5 MG/DL (ref 2.3–4.7)
PLATELET # BLD AUTO: 259 X10(3)/MCL (ref 140–400)
PMV BLD AUTO: 9.9 FL (ref 6.8–10)
POTASSIUM SERPL-SCNC: 4 MMOL/L (ref 3.5–5.1)
PROT SERPL-MCNC: 6.3 GM/DL (ref 5.8–7.6)
RBC # BLD AUTO: 3.17 X10(6)/MCL (ref 4.7–6.1)
SODIUM SERPL-SCNC: 138 MMOL/L (ref 136–145)
WBC # SPEC AUTO: 8.4 X10(3)/MCL (ref 4.5–11.5)

## 2021-03-19 LAB
ABS NEUT (OLG): 6.4 X10(3)/MCL (ref 1.5–6.9)
ALBUMIN SERPL-MCNC: 2.8 GM/DL (ref 3.4–4.8)
ALBUMIN/GLOB SERPL: 0.8 RATIO (ref 1.1–2)
ALP SERPL-CCNC: 108 UNIT/L (ref 40–150)
ALT SERPL-CCNC: 42 UNIT/L (ref 0–55)
AST SERPL-CCNC: 25 UNIT/L (ref 5–34)
BASOPHILS # BLD AUTO: 0.1 X10(3)/MCL (ref 0–0.1)
BASOPHILS NFR BLD AUTO: 1 % (ref 0–1)
BILIRUB SERPL-MCNC: 0.3 MG/DL
BILIRUBIN DIRECT+TOT PNL SERPL-MCNC: 0.1 MG/DL (ref 0–0.5)
BILIRUBIN DIRECT+TOT PNL SERPL-MCNC: 0.2 MG/DL (ref 0–0.8)
BUN SERPL-MCNC: 23 MG/DL (ref 8.4–25.7)
CALCIUM SERPL-MCNC: 8.9 MG/DL (ref 8.8–10)
CHLORIDE SERPL-SCNC: 101 MMOL/L (ref 98–107)
CO2 SERPL-SCNC: 29 MMOL/L (ref 23–31)
CREAT SERPL-MCNC: 0.71 MG/DL (ref 0.73–1.18)
EOSINOPHIL # BLD AUTO: 0.4 X10(3)/MCL (ref 0–0.6)
EOSINOPHIL NFR BLD AUTO: 5 % (ref 0–5)
ERYTHROCYTE [DISTWIDTH] IN BLOOD BY AUTOMATED COUNT: 13.2 % (ref 11.5–17)
GLOBULIN SER-MCNC: 3.6 GM/DL (ref 2.4–3.5)
GLUCOSE SERPL-MCNC: 95 MG/DL (ref 82–115)
HCT VFR BLD AUTO: 30.9 % (ref 42–52)
HGB BLD-MCNC: 10.3 GM/DL (ref 14–18)
IMM GRANULOCYTES # BLD AUTO: 0.09 10*3/UL (ref 0–0.02)
IMM GRANULOCYTES NFR BLD AUTO: 1 % (ref 0–0.43)
LYMPHOCYTES # BLD AUTO: 1.2 X10(3)/MCL (ref 0.5–4.1)
LYMPHOCYTES NFR BLD AUTO: 13 % (ref 15–40)
MAGNESIUM SERPL-MCNC: 1.7 MG/DL (ref 1.6–2.6)
MCH RBC QN AUTO: 32 PG (ref 27–34)
MCHC RBC AUTO-ENTMCNC: 33 GM/DL (ref 31–36)
MCV RBC AUTO: 96 FL (ref 80–99)
MONOCYTES # BLD AUTO: 0.7 X10(3)/MCL (ref 0–1.1)
MONOCYTES NFR BLD AUTO: 8 % (ref 4–12)
NEUTROPHILS # BLD AUTO: 6.4 X10(3)/MCL (ref 1.5–6.9)
NEUTROPHILS NFR BLD AUTO: 72 % (ref 43–75)
PHOSPHATE SERPL-MCNC: 3.1 MG/DL (ref 2.3–4.7)
PLATELET # BLD AUTO: 262 X10(3)/MCL (ref 140–400)
PMV BLD AUTO: 10 FL (ref 6.8–10)
POTASSIUM SERPL-SCNC: 4.3 MMOL/L (ref 3.5–5.1)
PROT SERPL-MCNC: 6.4 GM/DL (ref 5.8–7.6)
RBC # BLD AUTO: 3.21 X10(6)/MCL (ref 4.7–6.1)
SODIUM SERPL-SCNC: 137 MMOL/L (ref 136–145)
WBC # SPEC AUTO: 8.8 X10(3)/MCL (ref 4.5–11.5)

## 2021-03-21 LAB
ABS NEUT (OLG): 8.8 X10(3)/MCL (ref 1.5–6.9)
ALBUMIN SERPL-MCNC: 3.1 GM/DL (ref 3.4–4.8)
ALBUMIN/GLOB SERPL: 0.7 RATIO (ref 1.1–2)
ALP SERPL-CCNC: 132 UNIT/L (ref 40–150)
ALT SERPL-CCNC: 87 UNIT/L (ref 0–55)
AST SERPL-CCNC: 47 UNIT/L (ref 5–34)
BASOPHILS # BLD AUTO: 0.1 X10(3)/MCL (ref 0–0.1)
BASOPHILS NFR BLD AUTO: 1 % (ref 0–1)
BILIRUB SERPL-MCNC: 0.3 MG/DL
BILIRUBIN DIRECT+TOT PNL SERPL-MCNC: 0.1 MG/DL (ref 0–0.5)
BILIRUBIN DIRECT+TOT PNL SERPL-MCNC: 0.2 MG/DL (ref 0–0.8)
BUN SERPL-MCNC: 21 MG/DL (ref 8.4–25.7)
CALCIUM SERPL-MCNC: 9.4 MG/DL (ref 8.8–10)
CHLORIDE SERPL-SCNC: 99 MMOL/L (ref 98–107)
CO2 SERPL-SCNC: 28 MMOL/L (ref 23–31)
CREAT SERPL-MCNC: 0.75 MG/DL (ref 0.73–1.18)
EOSINOPHIL # BLD AUTO: 0.4 X10(3)/MCL (ref 0–0.6)
EOSINOPHIL NFR BLD AUTO: 4 % (ref 0–5)
ERYTHROCYTE [DISTWIDTH] IN BLOOD BY AUTOMATED COUNT: 13.2 % (ref 11.5–17)
GLOBULIN SER-MCNC: 4.3 GM/DL (ref 2.4–3.5)
GLUCOSE SERPL-MCNC: 87 MG/DL (ref 82–115)
HCT VFR BLD AUTO: 38.2 % (ref 42–52)
HGB BLD-MCNC: 11.9 GM/DL (ref 14–18)
IMM GRANULOCYTES # BLD AUTO: 0.09 10*3/UL (ref 0–0.02)
IMM GRANULOCYTES NFR BLD AUTO: 0.8 % (ref 0–0.43)
LYMPHOCYTES # BLD AUTO: 1.1 X10(3)/MCL (ref 0.5–4.1)
LYMPHOCYTES NFR BLD AUTO: 10 % (ref 15–40)
MAGNESIUM SERPL-MCNC: 2 MG/DL (ref 1.6–2.6)
MCH RBC QN AUTO: 32 PG (ref 27–34)
MCHC RBC AUTO-ENTMCNC: 31 GM/DL (ref 31–36)
MCV RBC AUTO: 101 FL (ref 80–99)
MONOCYTES # BLD AUTO: 0.7 X10(3)/MCL (ref 0–1.1)
MONOCYTES NFR BLD AUTO: 6 % (ref 4–12)
NEUTROPHILS # BLD AUTO: 8.8 X10(3)/MCL (ref 1.5–6.9)
NEUTROPHILS NFR BLD AUTO: 79 % (ref 43–75)
PHOSPHATE SERPL-MCNC: 3.2 MG/DL (ref 2.3–4.7)
PLATELET # BLD AUTO: 257 X10(3)/MCL (ref 140–400)
PMV BLD AUTO: 9.7 FL (ref 6.8–10)
POTASSIUM SERPL-SCNC: 4.1 MMOL/L (ref 3.5–5.1)
PROT SERPL-MCNC: 7.4 GM/DL (ref 5.8–7.6)
RBC # BLD AUTO: 3.77 X10(6)/MCL (ref 4.7–6.1)
SODIUM SERPL-SCNC: 136 MMOL/L (ref 136–145)
WBC # SPEC AUTO: 11.1 X10(3)/MCL (ref 4.5–11.5)

## 2021-03-24 LAB
ABS NEUT (OLG): 5.8 X10(3)/MCL (ref 1.5–6.9)
ALBUMIN SERPL-MCNC: 2.9 GM/DL (ref 3.4–4.8)
ALBUMIN/GLOB SERPL: 0.8 RATIO (ref 1.1–2)
ALP SERPL-CCNC: 122 UNIT/L (ref 40–150)
ALT SERPL-CCNC: 71 UNIT/L (ref 0–55)
AST SERPL-CCNC: 31 UNIT/L (ref 5–34)
BASOPHILS # BLD AUTO: 0 X10(3)/MCL (ref 0–0.1)
BASOPHILS NFR BLD AUTO: 1 % (ref 0–1)
BILIRUB SERPL-MCNC: 0.3 MG/DL
BILIRUBIN DIRECT+TOT PNL SERPL-MCNC: 0.1 MG/DL (ref 0–0.5)
BILIRUBIN DIRECT+TOT PNL SERPL-MCNC: 0.2 MG/DL (ref 0–0.8)
BUN SERPL-MCNC: 21 MG/DL (ref 8.4–25.7)
CALCIUM SERPL-MCNC: 8.9 MG/DL (ref 8.8–10)
CHLORIDE SERPL-SCNC: 102 MMOL/L (ref 98–107)
CO2 SERPL-SCNC: 29 MMOL/L (ref 23–31)
CREAT SERPL-MCNC: 0.71 MG/DL (ref 0.73–1.18)
EOSINOPHIL # BLD AUTO: 0.4 X10(3)/MCL (ref 0–0.6)
EOSINOPHIL NFR BLD AUTO: 5 % (ref 0–5)
ERYTHROCYTE [DISTWIDTH] IN BLOOD BY AUTOMATED COUNT: 13.2 % (ref 11.5–17)
GLOBULIN SER-MCNC: 3.7 GM/DL (ref 2.4–3.5)
GLUCOSE SERPL-MCNC: 104 MG/DL (ref 82–115)
HCT VFR BLD AUTO: 29.7 % (ref 42–52)
HGB BLD-MCNC: 9.9 GM/DL (ref 14–18)
IMM GRANULOCYTES # BLD AUTO: 0.08 10*3/UL (ref 0–0.02)
IMM GRANULOCYTES NFR BLD AUTO: 1 % (ref 0–0.43)
LYMPHOCYTES # BLD AUTO: 0.9 X10(3)/MCL (ref 0.5–4.1)
LYMPHOCYTES NFR BLD AUTO: 12 % (ref 15–40)
MAGNESIUM SERPL-MCNC: 1.9 MG/DL (ref 1.6–2.6)
MCH RBC QN AUTO: 32 PG (ref 27–34)
MCHC RBC AUTO-ENTMCNC: 33 GM/DL (ref 31–36)
MCV RBC AUTO: 96 FL (ref 80–99)
MONOCYTES # BLD AUTO: 0.5 X10(3)/MCL (ref 0–1.1)
MONOCYTES NFR BLD AUTO: 6 % (ref 4–12)
NEUTROPHILS # BLD AUTO: 5.8 X10(3)/MCL (ref 1.5–6.9)
NEUTROPHILS NFR BLD AUTO: 75 % (ref 43–75)
PHOSPHATE SERPL-MCNC: 2.9 MG/DL (ref 2.3–4.7)
PLATELET # BLD AUTO: 242 X10(3)/MCL (ref 140–400)
PMV BLD AUTO: 9.3 FL (ref 6.8–10)
POTASSIUM SERPL-SCNC: 4.1 MMOL/L (ref 3.5–5.1)
PREALB SERPL-MCNC: 24.1 MG/DL (ref 16–42)
PROT SERPL-MCNC: 6.6 GM/DL (ref 5.8–7.6)
RBC # BLD AUTO: 3.11 X10(6)/MCL (ref 4.7–6.1)
SARS-COV-2 AG RESP QL IA.RAPID: NOT DETECTED
SODIUM SERPL-SCNC: 139 MMOL/L (ref 136–145)
WBC # SPEC AUTO: 7.7 X10(3)/MCL (ref 4.5–11.5)

## 2021-05-10 ENCOUNTER — HISTORICAL (OUTPATIENT)
Dept: RADIOLOGY | Facility: HOSPITAL | Age: 61
End: 2021-05-10

## 2021-07-30 ENCOUNTER — HISTORICAL (OUTPATIENT)
Dept: ADMINISTRATIVE | Facility: HOSPITAL | Age: 61
End: 2021-07-30

## 2021-07-30 LAB
ABS NEUT (OLG): 8.1 X10(3)/MCL (ref 1.5–6.9)
ALBUMIN SERPL-MCNC: 3.2 GM/DL (ref 3.4–4.8)
ALBUMIN/GLOB SERPL: 0.7 RATIO (ref 1.1–2)
ALP SERPL-CCNC: 233 UNIT/L (ref 40–150)
ALT SERPL-CCNC: 440 UNIT/L (ref 0–55)
AST SERPL-CCNC: 286 UNIT/L (ref 5–34)
BASOPHILS # BLD AUTO: 0 X10(3)/MCL (ref 0–0.1)
BASOPHILS NFR BLD AUTO: 0 % (ref 0–1)
BILIRUB SERPL-MCNC: 0.3 MG/DL
BILIRUBIN DIRECT+TOT PNL SERPL-MCNC: 0.1 MG/DL (ref 0–0.8)
BILIRUBIN DIRECT+TOT PNL SERPL-MCNC: 0.2 MG/DL (ref 0–0.5)
BUN SERPL-MCNC: 38 MG/DL (ref 8.4–25.7)
CALCIUM SERPL-MCNC: 9.5 MG/DL (ref 8.8–10)
CHLORIDE SERPL-SCNC: 107 MMOL/L (ref 98–107)
CO2 SERPL-SCNC: 30 MMOL/L (ref 23–31)
CREAT SERPL-MCNC: 0.96 MG/DL (ref 0.73–1.18)
EOSINOPHIL # BLD AUTO: 0.3 X10(3)/MCL (ref 0–0.6)
EOSINOPHIL NFR BLD AUTO: 3 % (ref 0–5)
ERYTHROCYTE [DISTWIDTH] IN BLOOD BY AUTOMATED COUNT: 14.3 % (ref 11.5–17)
GLOBULIN SER-MCNC: 4.9 GM/DL (ref 2.4–3.5)
GLUCOSE SERPL-MCNC: 122 MG/DL (ref 82–115)
HCT VFR BLD AUTO: 41.7 % (ref 42–52)
HGB BLD-MCNC: 12.9 GM/DL (ref 14–18)
IMM GRANULOCYTES # BLD AUTO: 0.04 10*3/UL (ref 0–0.02)
IMM GRANULOCYTES NFR BLD AUTO: 0.4 % (ref 0–0.43)
LACTATE SERPL-SCNC: 2 MMOL/L (ref 0.5–2.2)
LYMPHOCYTES # BLD AUTO: 1.3 X10(3)/MCL (ref 0.5–4.1)
LYMPHOCYTES NFR BLD AUTO: 12 % (ref 15–40)
MCH RBC QN AUTO: 28 PG (ref 27–34)
MCHC RBC AUTO-ENTMCNC: 31 GM/DL (ref 31–36)
MCV RBC AUTO: 91 FL (ref 80–99)
MONOCYTES # BLD AUTO: 0.6 X10(3)/MCL (ref 0–1.1)
MONOCYTES NFR BLD AUTO: 6 % (ref 4–12)
NEUTROPHILS # BLD AUTO: 8.1 X10(3)/MCL (ref 1.5–6.9)
NEUTROPHILS NFR BLD AUTO: 78 % (ref 43–75)
PLATELET # BLD AUTO: 340 X10(3)/MCL (ref 140–400)
PMV BLD AUTO: 10.3 FL (ref 6.8–10)
POTASSIUM SERPL-SCNC: 4.2 MMOL/L (ref 3.5–5.1)
PROT SERPL-MCNC: 8.1 GM/DL (ref 5.8–7.6)
RBC # BLD AUTO: 4.6 X10(6)/MCL (ref 4.7–6.1)
SODIUM SERPL-SCNC: 148 MMOL/L (ref 136–145)
WBC # SPEC AUTO: 10.5 X10(3)/MCL (ref 4.5–11.5)

## 2021-07-31 ENCOUNTER — HISTORICAL (OUTPATIENT)
Dept: ADMINISTRATIVE | Facility: HOSPITAL | Age: 61
End: 2021-07-31

## 2021-07-31 LAB
ABS NEUT (OLG): 9.2 X10(3)/MCL (ref 1.5–6.9)
ALBUMIN SERPL-MCNC: 2.8 GM/DL (ref 3.4–4.8)
ALBUMIN/GLOB SERPL: 0.7 RATIO (ref 1.1–2)
ALP SERPL-CCNC: 210 UNIT/L (ref 40–150)
ALT SERPL-CCNC: 342 UNIT/L (ref 0–55)
AST SERPL-CCNC: 158 UNIT/L (ref 5–34)
BASOPHILS # BLD AUTO: 0.1 X10(3)/MCL (ref 0–0.1)
BASOPHILS NFR BLD AUTO: 1 % (ref 0–1)
BILIRUB SERPL-MCNC: 0.3 MG/DL
BILIRUBIN DIRECT+TOT PNL SERPL-MCNC: 0.1 MG/DL (ref 0–0.8)
BILIRUBIN DIRECT+TOT PNL SERPL-MCNC: 0.2 MG/DL (ref 0–0.5)
BUN SERPL-MCNC: 32 MG/DL (ref 8.4–25.7)
CALCIUM SERPL-MCNC: 8.7 MG/DL (ref 8.8–10)
CHLORIDE SERPL-SCNC: 106 MMOL/L (ref 98–107)
CO2 SERPL-SCNC: 29 MMOL/L (ref 23–31)
CREAT SERPL-MCNC: 0.78 MG/DL (ref 0.73–1.18)
EOSINOPHIL # BLD AUTO: 0.4 X10(3)/MCL (ref 0–0.6)
EOSINOPHIL NFR BLD AUTO: 4 % (ref 0–5)
ERYTHROCYTE [DISTWIDTH] IN BLOOD BY AUTOMATED COUNT: 14.3 % (ref 11.5–17)
GLOBULIN SER-MCNC: 4.1 GM/DL (ref 2.4–3.5)
GLUCOSE SERPL-MCNC: 113 MG/DL (ref 82–115)
HCT VFR BLD AUTO: 37.5 % (ref 42–52)
HGB BLD-MCNC: 11.8 GM/DL (ref 14–18)
IMM GRANULOCYTES # BLD AUTO: 0.07 10*3/UL (ref 0–0.02)
IMM GRANULOCYTES NFR BLD AUTO: 0.6 % (ref 0–0.43)
LACTATE SERPL-SCNC: 1.9 MMOL/L (ref 0.5–2.2)
LYMPHOCYTES # BLD AUTO: 1.4 X10(3)/MCL (ref 0.5–4.1)
LYMPHOCYTES NFR BLD AUTO: 12 % (ref 15–40)
MCH RBC QN AUTO: 28 PG (ref 27–34)
MCHC RBC AUTO-ENTMCNC: 32 GM/DL (ref 31–36)
MCV RBC AUTO: 90 FL (ref 80–99)
MONOCYTES # BLD AUTO: 0.7 X10(3)/MCL (ref 0–1.1)
MONOCYTES NFR BLD AUTO: 6 % (ref 4–12)
NEUTROPHILS # BLD AUTO: 9.2 X10(3)/MCL (ref 1.5–6.9)
NEUTROPHILS NFR BLD AUTO: 77 % (ref 43–75)
PLATELET # BLD AUTO: 320 X10(3)/MCL (ref 140–400)
PMV BLD AUTO: 10 FL (ref 6.8–10)
POTASSIUM SERPL-SCNC: 4.3 MMOL/L (ref 3.5–5.1)
PROT SERPL-MCNC: 6.9 GM/DL (ref 5.8–7.6)
RBC # BLD AUTO: 4.17 X10(6)/MCL (ref 4.7–6.1)
SODIUM SERPL-SCNC: 143 MMOL/L (ref 136–145)
WBC # SPEC AUTO: 11.9 X10(3)/MCL (ref 4.5–11.5)

## 2021-08-02 ENCOUNTER — HISTORICAL (OUTPATIENT)
Dept: LAB | Facility: HOSPITAL | Age: 61
End: 2021-08-02

## 2021-08-02 LAB
ALBUMIN SERPL-MCNC: 2.6 GM/DL (ref 3.4–4.8)
ALBUMIN/GLOB SERPL: 0.6 RATIO (ref 1.1–2)
ALP SERPL-CCNC: 186 UNIT/L (ref 40–150)
ALT SERPL-CCNC: 269 UNIT/L (ref 0–55)
AST SERPL-CCNC: 101 UNIT/L (ref 5–34)
BILIRUB SERPL-MCNC: 0.3 MG/DL
BILIRUBIN DIRECT+TOT PNL SERPL-MCNC: 0.1 MG/DL (ref 0–0.5)
BILIRUBIN DIRECT+TOT PNL SERPL-MCNC: 0.2 MG/DL (ref 0–0.8)
BUN SERPL-MCNC: 24 MG/DL (ref 8.4–25.7)
CALCIUM SERPL-MCNC: 8.4 MG/DL (ref 8.8–10)
CHLORIDE SERPL-SCNC: 101 MMOL/L (ref 98–107)
CO2 SERPL-SCNC: 30 MMOL/L (ref 23–31)
CREAT SERPL-MCNC: 0.73 MG/DL (ref 0.73–1.18)
GLOBULIN SER-MCNC: 4.1 GM/DL (ref 2.4–3.5)
GLUCOSE SERPL-MCNC: 110 MG/DL (ref 82–115)
POTASSIUM SERPL-SCNC: 4.2 MMOL/L (ref 3.5–5.1)
PROT SERPL-MCNC: 6.7 GM/DL (ref 5.8–7.6)
SODIUM SERPL-SCNC: 137 MMOL/L (ref 136–145)

## 2021-08-03 ENCOUNTER — HOSPITAL ENCOUNTER (OUTPATIENT)
Dept: MEDSURG UNIT | Facility: HOSPITAL | Age: 61
End: 2021-08-06
Attending: INTERNAL MEDICINE | Admitting: INTERNAL MEDICINE

## 2021-08-03 LAB
ABS NEUT (OLG): 10 X10(3)/MCL (ref 1.5–6.9)
ALBUMIN SERPL-MCNC: 2.6 GM/DL (ref 3.4–4.8)
ALBUMIN/GLOB SERPL: 0.5 RATIO (ref 1.1–2)
ALP SERPL-CCNC: 176 UNIT/L (ref 40–150)
ALT SERPL-CCNC: 279 UNIT/L (ref 0–55)
AMYLASE SERPL-CCNC: 30 UNIT/L (ref 25–125)
APPEARANCE, UA: CLEAR
AST SERPL-CCNC: 118 UNIT/L (ref 5–34)
BACTERIA SPEC CULT: ABNORMAL /HPF
BASOPHILS # BLD AUTO: 0.1 X10(3)/MCL (ref 0–0.1)
BASOPHILS NFR BLD AUTO: 1 % (ref 0–1)
BILIRUB SERPL-MCNC: 0.4 MG/DL
BILIRUB UR QL STRIP: NEGATIVE
BILIRUBIN DIRECT+TOT PNL SERPL-MCNC: 0.1 MG/DL (ref 0–0.5)
BILIRUBIN DIRECT+TOT PNL SERPL-MCNC: 0.3 MG/DL (ref 0–0.8)
BUN SERPL-MCNC: 23 MG/DL (ref 8.4–25.7)
CALCIUM SERPL-MCNC: 9.2 MG/DL (ref 8.8–10)
CHLORIDE SERPL-SCNC: 98 MMOL/L (ref 98–107)
CK MB SERPL-MCNC: 0.3 NG/ML
CK SERPL-CCNC: 34 U/L (ref 30–200)
CO2 SERPL-SCNC: 27 MMOL/L (ref 23–31)
COLOR UR: ABNORMAL
CREAT SERPL-MCNC: 0.76 MG/DL (ref 0.73–1.18)
EOSINOPHIL # BLD AUTO: 0.2 X10(3)/MCL (ref 0–0.6)
EOSINOPHIL NFR BLD AUTO: 2 % (ref 0–5)
ERYTHROCYTE [DISTWIDTH] IN BLOOD BY AUTOMATED COUNT: 13.9 % (ref 11.5–17)
GLOBULIN SER-MCNC: 5.3 GM/DL (ref 2.4–3.5)
GLUCOSE (UA): NEGATIVE MG/DL
GLUCOSE SERPL-MCNC: 86 MG/DL (ref 82–115)
HCT VFR BLD AUTO: 35.2 % (ref 42–52)
HGB BLD-MCNC: 11.2 GM/DL (ref 14–18)
HGB UR QL STRIP: ABNORMAL
IMM GRANULOCYTES # BLD AUTO: 0.14 10*3/UL (ref 0–0.02)
IMM GRANULOCYTES NFR BLD AUTO: 1.1 % (ref 0–0.43)
KETONES UR QL STRIP: NEGATIVE MG/DL
LEUKOCYTE ESTERASE UR QL STRIP: ABNORMAL
LIPASE SERPL-CCNC: 32 U/L
LYMPHOCYTES # BLD AUTO: 1.5 X10(3)/MCL (ref 0.5–4.1)
LYMPHOCYTES NFR BLD AUTO: 12 % (ref 15–40)
MCH RBC QN AUTO: 29 PG (ref 27–34)
MCHC RBC AUTO-ENTMCNC: 32 GM/DL (ref 31–36)
MCV RBC AUTO: 90 FL (ref 80–99)
MONOCYTES # BLD AUTO: 0.5 X10(3)/MCL (ref 0–1.1)
MONOCYTES NFR BLD AUTO: 4 % (ref 4–12)
NEUTROPHILS # BLD AUTO: 10 X10(3)/MCL (ref 1.5–6.9)
NEUTROPHILS NFR BLD AUTO: 80 % (ref 43–75)
NITRITE UR QL STRIP: NEGATIVE
PH UR STRIP: 5.5 [PH] (ref 4.6–8)
PLATELET # BLD AUTO: 358 X10(3)/MCL (ref 140–400)
PMV BLD AUTO: 10.2 FL (ref 6.8–10)
POTASSIUM SERPL-SCNC: 4.7 MMOL/L (ref 3.5–5.1)
PROT SERPL-MCNC: 7.9 GM/DL (ref 5.8–7.6)
PROT UR QL STRIP: 30 MG/DL
RBC # BLD AUTO: 3.9 X10(6)/MCL (ref 4.7–6.1)
RBC #/AREA URNS HPF: ABNORMAL /HPF
SARS-COV-2 AG RESP QL IA.RAPID: NOT DETECTED
SODIUM SERPL-SCNC: 133 MMOL/L (ref 136–145)
SP GR UR STRIP: 1.01 (ref 1–1.03)
SQUAMOUS EPITHELIAL, UA: ABNORMAL
TROPONIN I SERPL-MCNC: 0.02 NG/ML (ref 0–0.04)
UROBILINOGEN UR STRIP-ACNC: 0.2 EU/DL
WBC # SPEC AUTO: 12.4 X10(3)/MCL (ref 4.5–11.5)
WBC #/AREA URNS HPF: ABNORMAL /HPF

## 2021-08-04 LAB
ABS NEUT (OLG): 9.3 X10(3)/MCL (ref 1.5–6.9)
ALBUMIN SERPL-MCNC: 2.3 GM/DL (ref 3.4–4.8)
ALBUMIN/GLOB SERPL: 0.5 RATIO (ref 1.1–2)
ALP SERPL-CCNC: 151 UNIT/L (ref 40–150)
ALT SERPL-CCNC: 209 UNIT/L (ref 0–55)
AST SERPL-CCNC: 72 UNIT/L (ref 5–34)
BASOPHILS # BLD AUTO: 0.1 X10(3)/MCL (ref 0–0.1)
BASOPHILS NFR BLD AUTO: 1 % (ref 0–1)
BILIRUB SERPL-MCNC: 0.5 MG/DL
BILIRUBIN DIRECT+TOT PNL SERPL-MCNC: 0.2 MG/DL (ref 0–0.5)
BILIRUBIN DIRECT+TOT PNL SERPL-MCNC: 0.3 MG/DL (ref 0–0.8)
BUN SERPL-MCNC: 21 MG/DL (ref 8.4–25.7)
CALCIUM SERPL-MCNC: 8.5 MG/DL (ref 8.8–10)
CHLORIDE SERPL-SCNC: 102 MMOL/L (ref 98–107)
CO2 SERPL-SCNC: 30 MMOL/L (ref 23–31)
CREAT SERPL-MCNC: 0.75 MG/DL (ref 0.73–1.18)
EOSINOPHIL # BLD AUTO: 0.2 X10(3)/MCL (ref 0–0.6)
EOSINOPHIL NFR BLD AUTO: 2 % (ref 0–5)
ERYTHROCYTE [DISTWIDTH] IN BLOOD BY AUTOMATED COUNT: 13.8 % (ref 11.5–17)
GLOBULIN SER-MCNC: 4.5 GM/DL (ref 2.4–3.5)
GLUCOSE SERPL-MCNC: 100 MG/DL (ref 82–115)
HCT VFR BLD AUTO: 34.6 % (ref 42–52)
HGB BLD-MCNC: 10.9 GM/DL (ref 14–18)
IMM GRANULOCYTES # BLD AUTO: 0.1 10*3/UL (ref 0–0.02)
IMM GRANULOCYTES NFR BLD AUTO: 0.9 % (ref 0–0.43)
LYMPHOCYTES # BLD AUTO: 1.1 X10(3)/MCL (ref 0.5–4.1)
LYMPHOCYTES NFR BLD AUTO: 10 % (ref 15–40)
MAGNESIUM SERPL-MCNC: 1.9 MG/DL (ref 1.6–2.6)
MCH RBC QN AUTO: 28 PG (ref 27–34)
MCHC RBC AUTO-ENTMCNC: 32 GM/DL (ref 31–36)
MCV RBC AUTO: 90 FL (ref 80–99)
MONOCYTES # BLD AUTO: 0.5 X10(3)/MCL (ref 0–1.1)
MONOCYTES NFR BLD AUTO: 5 % (ref 4–12)
NEUTROPHILS # BLD AUTO: 9.3 X10(3)/MCL (ref 1.5–6.9)
NEUTROPHILS NFR BLD AUTO: 82 % (ref 43–75)
PLATELET # BLD AUTO: 329 X10(3)/MCL (ref 140–400)
PMV BLD AUTO: 9.6 FL (ref 6.8–10)
POTASSIUM SERPL-SCNC: 4.4 MMOL/L (ref 3.5–5.1)
PROT SERPL-MCNC: 6.8 GM/DL (ref 5.8–7.6)
RBC # BLD AUTO: 3.84 X10(6)/MCL (ref 4.7–6.1)
SODIUM SERPL-SCNC: 136 MMOL/L (ref 136–145)
WBC # SPEC AUTO: 11.2 X10(3)/MCL (ref 4.5–11.5)

## 2021-08-05 LAB
ABS NEUT (OLG): 8.4 X10(3)/MCL (ref 1.5–6.9)
ALBUMIN SERPL-MCNC: 2.3 GM/DL (ref 3.4–4.8)
ALBUMIN/GLOB SERPL: 0.5 RATIO (ref 1.1–2)
ALP SERPL-CCNC: 142 UNIT/L (ref 40–150)
ALT SERPL-CCNC: 141 UNIT/L (ref 0–55)
AST SERPL-CCNC: 36 UNIT/L (ref 5–34)
BASOPHILS # BLD AUTO: 0 X10(3)/MCL (ref 0–0.1)
BASOPHILS NFR BLD AUTO: 0 % (ref 0–1)
BILIRUB SERPL-MCNC: 0.4 MG/DL
BILIRUBIN DIRECT+TOT PNL SERPL-MCNC: 0.2 MG/DL (ref 0–0.5)
BILIRUBIN DIRECT+TOT PNL SERPL-MCNC: 0.2 MG/DL (ref 0–0.8)
BUN SERPL-MCNC: 13 MG/DL (ref 8.4–25.7)
CALCIUM SERPL-MCNC: 8.5 MG/DL (ref 8.8–10)
CHLORIDE SERPL-SCNC: 106 MMOL/L (ref 98–107)
CO2 SERPL-SCNC: 26 MMOL/L (ref 23–31)
CREAT SERPL-MCNC: 0.79 MG/DL (ref 0.73–1.18)
EOSINOPHIL # BLD AUTO: 0.2 X10(3)/MCL (ref 0–0.6)
EOSINOPHIL NFR BLD AUTO: 2 % (ref 0–5)
ERYTHROCYTE [DISTWIDTH] IN BLOOD BY AUTOMATED COUNT: 13.8 % (ref 11.5–17)
GLOBULIN SER-MCNC: 4.2 GM/DL (ref 2.4–3.5)
GLUCOSE SERPL-MCNC: 91 MG/DL (ref 82–115)
HCT VFR BLD AUTO: 33.4 % (ref 42–52)
HGB BLD-MCNC: 10.8 GM/DL (ref 14–18)
IMM GRANULOCYTES # BLD AUTO: 0.09 10*3/UL (ref 0–0.02)
IMM GRANULOCYTES NFR BLD AUTO: 0.9 % (ref 0–0.43)
LYMPHOCYTES # BLD AUTO: 1.1 X10(3)/MCL (ref 0.5–4.1)
LYMPHOCYTES NFR BLD AUTO: 10 % (ref 15–40)
MAGNESIUM SERPL-MCNC: 1.7 MG/DL (ref 1.6–2.6)
MCH RBC QN AUTO: 29 PG (ref 27–34)
MCHC RBC AUTO-ENTMCNC: 32 GM/DL (ref 31–36)
MCV RBC AUTO: 89 FL (ref 80–99)
MONOCYTES # BLD AUTO: 0.6 X10(3)/MCL (ref 0–1.1)
MONOCYTES NFR BLD AUTO: 6 % (ref 4–12)
NEUTROPHILS # BLD AUTO: 8.4 X10(3)/MCL (ref 1.5–6.9)
NEUTROPHILS NFR BLD AUTO: 80 % (ref 43–75)
PLATELET # BLD AUTO: 320 X10(3)/MCL (ref 140–400)
PMV BLD AUTO: 9.8 FL (ref 6.8–10)
POTASSIUM SERPL-SCNC: 3.9 MMOL/L (ref 3.5–5.1)
PROT SERPL-MCNC: 6.5 GM/DL (ref 5.8–7.6)
RBC # BLD AUTO: 3.74 X10(6)/MCL (ref 4.7–6.1)
SODIUM SERPL-SCNC: 135 MMOL/L (ref 136–145)
WBC # SPEC AUTO: 10.5 X10(3)/MCL (ref 4.5–11.5)

## 2021-08-07 LAB — FINAL CULTURE: NORMAL

## 2021-08-09 LAB
FINAL CULTURE: NORMAL
FINAL CULTURE: NORMAL

## 2021-08-30 ENCOUNTER — HISTORICAL (OUTPATIENT)
Dept: LAB | Facility: HOSPITAL | Age: 61
End: 2021-08-30

## 2021-08-30 LAB
ABS NEUT (OLG): 21.8 X10(3)/MCL (ref 1.5–6.9)
ALBUMIN SERPL-MCNC: 2.4 GM/DL (ref 3.4–4.8)
ALBUMIN/GLOB SERPL: 0.4 RATIO (ref 1.1–2)
ALP SERPL-CCNC: 674 UNIT/L (ref 40–150)
ALT SERPL-CCNC: 295 UNIT/L (ref 0–55)
AST SERPL-CCNC: 107 UNIT/L (ref 5–34)
BILIRUB SERPL-MCNC: 0.5 MG/DL
BILIRUBIN DIRECT+TOT PNL SERPL-MCNC: 0.2 MG/DL (ref 0–0.8)
BILIRUBIN DIRECT+TOT PNL SERPL-MCNC: 0.3 MG/DL (ref 0–0.5)
BUN SERPL-MCNC: 31 MG/DL (ref 8.4–25.7)
CALCIUM SERPL-MCNC: 9.7 MG/DL (ref 8.8–10)
CHLORIDE SERPL-SCNC: 98 MMOL/L (ref 98–107)
CK SERPL-CCNC: <7 U/L (ref 30–200)
CO2 SERPL-SCNC: 23 MMOL/L (ref 23–31)
CREAT SERPL-MCNC: 0.82 MG/DL (ref 0.73–1.18)
ERYTHROCYTE [DISTWIDTH] IN BLOOD BY AUTOMATED COUNT: 16 % (ref 11.5–17)
GLOBULIN SER-MCNC: 6.6 GM/DL (ref 2.4–3.5)
GLUCOSE SERPL-MCNC: 102 MG/DL (ref 82–115)
HCT VFR BLD AUTO: 34.7 % (ref 42–52)
HGB BLD-MCNC: 10.9 GM/DL (ref 14–18)
LACTATE SERPL-SCNC: 3 MMOL/L (ref 0.5–2.2)
MCH RBC QN AUTO: 28 PG (ref 27–34)
MCHC RBC AUTO-ENTMCNC: 31 GM/DL (ref 31–36)
MCV RBC AUTO: 88 FL (ref 80–99)
PLATELET # BLD AUTO: 541 X10(3)/MCL (ref 140–400)
PMV BLD AUTO: 11.1 FL (ref 6.8–10)
POTASSIUM SERPL-SCNC: 4.6 MMOL/L (ref 3.5–5.1)
PROT SERPL-MCNC: 9 GM/DL (ref 5.8–7.6)
RBC # BLD AUTO: 3.94 X10(6)/MCL (ref 4.7–6.1)
SODIUM SERPL-SCNC: 133 MMOL/L (ref 136–145)
TSH SERPL-ACNC: 0.12 UIU/ML (ref 0.35–4.94)
WBC # SPEC AUTO: 24.9 X10(3)/MCL (ref 4.5–11.5)

## 2021-10-25 ENCOUNTER — HISTORICAL (OUTPATIENT)
Dept: RADIOLOGY | Facility: HOSPITAL | Age: 61
End: 2021-10-25

## 2022-01-29 ENCOUNTER — HISTORICAL (OUTPATIENT)
Dept: ADMINISTRATIVE | Facility: HOSPITAL | Age: 62
End: 2022-01-29

## 2022-01-30 LAB
APPEARANCE, UA: CLEAR
BILIRUB UR QL STRIP: NEGATIVE
COLOR UR: YELLOW
DO MICRO?: YES
GLUCOSE (UA): NEGATIVE
HGB UR QL STRIP: NORMAL
KETONES UR QL STRIP: NEGATIVE
LEUKOCYTE ESTERASE UR QL STRIP: NEGATIVE
NITRITE UR QL STRIP: NEGATIVE
PH UR STRIP: 7.5 [PH] (ref 4.6–8)
PROT UR QL STRIP: NEGATIVE
SP GR UR STRIP: 1.02 (ref 1–1.03)
UROBILINOGEN UR STRIP-ACNC: 0.2

## 2022-03-17 ENCOUNTER — HISTORICAL (OUTPATIENT)
Dept: ANESTHESIOLOGY | Facility: HOSPITAL | Age: 62
End: 2022-03-17

## 2022-04-25 ENCOUNTER — HISTORICAL (OUTPATIENT)
Dept: ADMINISTRATIVE | Facility: HOSPITAL | Age: 62
End: 2022-04-25
Payer: MEDICARE

## 2022-04-25 ENCOUNTER — HOSPITAL ENCOUNTER (INPATIENT)
Facility: HOSPITAL | Age: 62
LOS: 10 days | Discharge: HOSPICE | DRG: 871 | End: 2022-05-06
Attending: INTERNAL MEDICINE | Admitting: INTERNAL MEDICINE
Payer: MEDICARE

## 2022-04-25 PROCEDURE — 96365 THER/PROPH/DIAG IV INF INIT: CPT

## 2022-04-25 PROCEDURE — 80053 COMPREHEN METABOLIC PANEL: CPT

## 2022-04-25 PROCEDURE — 36415 COLL VENOUS BLD VENIPUNCTURE: CPT

## 2022-04-25 PROCEDURE — 99990 CHARGE CONVERSION: CPT | Mod: 25

## 2022-04-25 PROCEDURE — A9150 MISC/EXPER NON-PRESCRIPT DRU: HCPCS

## 2022-04-25 PROCEDURE — 81001 URINALYSIS AUTO W/SCOPE: CPT

## 2022-04-25 PROCEDURE — 87088 URINE BACTERIA CULTURE: CPT

## 2022-04-25 PROCEDURE — 87077 CULTURE AEROBIC IDENTIFY: CPT

## 2022-04-25 PROCEDURE — 71045 X-RAY EXAM CHEST 1 VIEW: CPT

## 2022-04-25 PROCEDURE — 96366 THER/PROPH/DIAG IV INF ADDON: CPT

## 2022-04-25 PROCEDURE — 96361 HYDRATE IV INFUSION ADD-ON: CPT

## 2022-04-25 PROCEDURE — 94761 N-INVAS EAR/PLS OXIMETRY MLT: CPT

## 2022-04-25 PROCEDURE — 87040 BLOOD CULTURE FOR BACTERIA: CPT

## 2022-04-25 PROCEDURE — 74176 CT ABD & PELVIS W/O CONTRAST: CPT

## 2022-04-25 PROCEDURE — 84484 ASSAY OF TROPONIN QUANT: CPT

## 2022-04-25 PROCEDURE — G0378 HOSPITAL OBSERVATION PER HR: HCPCS

## 2022-04-25 PROCEDURE — 99285 EMERGENCY DEPT VISIT HI MDM: CPT | Mod: 25

## 2022-04-25 PROCEDURE — 83605 ASSAY OF LACTIC ACID: CPT

## 2022-04-25 PROCEDURE — 85025 COMPLETE CBC W/AUTO DIFF WBC: CPT

## 2022-04-26 PROCEDURE — 80053 COMPREHEN METABOLIC PANEL: CPT

## 2022-04-26 PROCEDURE — 85025 COMPLETE CBC W/AUTO DIFF WBC: CPT

## 2022-04-26 PROCEDURE — 83735 ASSAY OF MAGNESIUM: CPT

## 2022-04-26 PROCEDURE — A9152 SINGLE VITAMIN NOS: HCPCS

## 2022-04-26 PROCEDURE — 99990 CHARGE CONVERSION: CPT

## 2022-04-26 PROCEDURE — 84100 ASSAY OF PHOSPHORUS: CPT

## 2022-04-26 PROCEDURE — 84443 ASSAY THYROID STIM HORMONE: CPT

## 2022-04-26 PROCEDURE — C9113 INJ PANTOPRAZOLE SODIUM, VIA: HCPCS

## 2022-04-26 PROCEDURE — 36415 COLL VENOUS BLD VENIPUNCTURE: CPT

## 2022-04-26 PROCEDURE — A9150 MISC/EXPER NON-PRESCRIPT DRU: HCPCS

## 2022-04-26 PROCEDURE — 94761 N-INVAS EAR/PLS OXIMETRY MLT: CPT

## 2022-04-27 PROCEDURE — 82803 BLOOD GASES ANY COMBINATION: CPT

## 2022-04-27 PROCEDURE — 83735 ASSAY OF MAGNESIUM: CPT

## 2022-04-27 PROCEDURE — A9152 SINGLE VITAMIN NOS: HCPCS

## 2022-04-27 PROCEDURE — 87635 SARS-COV-2 COVID-19 AMP PRB: CPT | Mod: QW

## 2022-04-27 PROCEDURE — A9150 MISC/EXPER NON-PRESCRIPT DRU: HCPCS

## 2022-04-27 PROCEDURE — 36600 WITHDRAWAL OF ARTERIAL BLOOD: CPT

## 2022-04-27 PROCEDURE — 99990 CHARGE CONVERSION: CPT | Mod: QW

## 2022-04-27 PROCEDURE — 84484 ASSAY OF TROPONIN QUANT: CPT

## 2022-04-27 PROCEDURE — 82550 ASSAY OF CK (CPK): CPT

## 2022-04-27 PROCEDURE — 71045 X-RAY EXAM CHEST 1 VIEW: CPT

## 2022-04-27 PROCEDURE — 84100 ASSAY OF PHOSPHORUS: CPT

## 2022-04-27 PROCEDURE — 36415 COLL VENOUS BLD VENIPUNCTURE: CPT

## 2022-04-27 PROCEDURE — 94761 N-INVAS EAR/PLS OXIMETRY MLT: CPT

## 2022-04-27 PROCEDURE — 94640 AIRWAY INHALATION TREATMENT: CPT

## 2022-04-27 PROCEDURE — 83930 ASSAY OF BLOOD OSMOLALITY: CPT

## 2022-04-27 PROCEDURE — 93970 EXTREMITY STUDY: CPT

## 2022-04-27 PROCEDURE — 94664 DEMO&/EVAL PT USE INHALER: CPT

## 2022-04-27 PROCEDURE — C9113 INJ PANTOPRAZOLE SODIUM, VIA: HCPCS

## 2022-04-27 PROCEDURE — 85025 COMPLETE CBC W/AUTO DIFF WBC: CPT

## 2022-04-27 PROCEDURE — 80053 COMPREHEN METABOLIC PANEL: CPT

## 2022-04-27 PROCEDURE — 82553 CREATINE MB FRACTION: CPT

## 2022-04-28 PROCEDURE — 94640 AIRWAY INHALATION TREATMENT: CPT

## 2022-04-28 PROCEDURE — C9113 INJ PANTOPRAZOLE SODIUM, VIA: HCPCS

## 2022-04-28 PROCEDURE — 85025 COMPLETE CBC W/AUTO DIFF WBC: CPT

## 2022-04-28 PROCEDURE — 82803 BLOOD GASES ANY COMBINATION: CPT

## 2022-04-28 PROCEDURE — 71045 X-RAY EXAM CHEST 1 VIEW: CPT

## 2022-04-28 PROCEDURE — A9152 SINGLE VITAMIN NOS: HCPCS

## 2022-04-28 PROCEDURE — A9150 MISC/EXPER NON-PRESCRIPT DRU: HCPCS

## 2022-04-28 PROCEDURE — 36415 COLL VENOUS BLD VENIPUNCTURE: CPT

## 2022-04-28 PROCEDURE — 80053 COMPREHEN METABOLIC PANEL: CPT

## 2022-04-28 PROCEDURE — 94761 N-INVAS EAR/PLS OXIMETRY MLT: CPT

## 2022-04-28 PROCEDURE — 93306 TTE W/DOPPLER COMPLETE: CPT

## 2022-04-28 PROCEDURE — 83880 ASSAY OF NATRIURETIC PEPTIDE: CPT

## 2022-04-28 PROCEDURE — 99990 CHARGE CONVERSION: CPT

## 2022-04-29 DIAGNOSIS — R41.82 ALTERED MENTAL STATUS: ICD-10-CM

## 2022-04-29 PROCEDURE — 93005 ELECTROCARDIOGRAM TRACING: CPT

## 2022-04-29 PROCEDURE — 99990 CHARGE CONVERSION: CPT

## 2022-04-29 PROCEDURE — A9152 SINGLE VITAMIN NOS: HCPCS

## 2022-04-29 PROCEDURE — 80053 COMPREHEN METABOLIC PANEL: CPT

## 2022-04-29 PROCEDURE — A9150 MISC/EXPER NON-PRESCRIPT DRU: HCPCS

## 2022-04-29 PROCEDURE — 94640 AIRWAY INHALATION TREATMENT: CPT

## 2022-04-29 PROCEDURE — 51702 INSERT TEMP BLADDER CATH: CPT

## 2022-04-29 PROCEDURE — 83735 ASSAY OF MAGNESIUM: CPT

## 2022-04-29 PROCEDURE — 71045 X-RAY EXAM CHEST 1 VIEW: CPT

## 2022-04-29 PROCEDURE — 36415 COLL VENOUS BLD VENIPUNCTURE: CPT

## 2022-04-29 PROCEDURE — 82803 BLOOD GASES ANY COMBINATION: CPT

## 2022-04-29 PROCEDURE — C9113 INJ PANTOPRAZOLE SODIUM, VIA: HCPCS

## 2022-04-29 PROCEDURE — 85025 COMPLETE CBC W/AUTO DIFF WBC: CPT

## 2022-04-29 PROCEDURE — A9153 MULTI-VITAMIN NOS: HCPCS

## 2022-04-29 PROCEDURE — 36600 WITHDRAWAL OF ARTERIAL BLOOD: CPT

## 2022-04-29 PROCEDURE — 85379 FIBRIN DEGRADATION QUANT: CPT

## 2022-04-29 PROCEDURE — 84100 ASSAY OF PHOSPHORUS: CPT

## 2022-04-29 PROCEDURE — 94761 N-INVAS EAR/PLS OXIMETRY MLT: CPT

## 2022-04-30 PROCEDURE — 94761 N-INVAS EAR/PLS OXIMETRY MLT: CPT

## 2022-04-30 PROCEDURE — 94640 AIRWAY INHALATION TREATMENT: CPT

## 2022-04-30 PROCEDURE — 82803 BLOOD GASES ANY COMBINATION: CPT

## 2022-04-30 PROCEDURE — 71045 X-RAY EXAM CHEST 1 VIEW: CPT

## 2022-04-30 PROCEDURE — 31720 CLEARANCE OF AIRWAYS: CPT

## 2022-04-30 PROCEDURE — A9153 MULTI-VITAMIN NOS: HCPCS

## 2022-04-30 PROCEDURE — C9113 INJ PANTOPRAZOLE SODIUM, VIA: HCPCS

## 2022-04-30 PROCEDURE — 84100 ASSAY OF PHOSPHORUS: CPT

## 2022-04-30 PROCEDURE — 80053 COMPREHEN METABOLIC PANEL: CPT

## 2022-04-30 PROCEDURE — 87070 CULTURE OTHR SPECIMN AEROBIC: CPT

## 2022-04-30 PROCEDURE — 94799 UNLISTED PULMONARY SVC/PX: CPT

## 2022-04-30 PROCEDURE — 83735 ASSAY OF MAGNESIUM: CPT

## 2022-04-30 PROCEDURE — 36415 COLL VENOUS BLD VENIPUNCTURE: CPT

## 2022-04-30 PROCEDURE — A9150 MISC/EXPER NON-PRESCRIPT DRU: HCPCS

## 2022-04-30 PROCEDURE — 85025 COMPLETE CBC W/AUTO DIFF WBC: CPT

## 2022-04-30 PROCEDURE — 99990 CHARGE CONVERSION: CPT

## 2022-04-30 PROCEDURE — A9152 SINGLE VITAMIN NOS: HCPCS

## 2022-04-30 RX ORDER — UBIDECARENONE 75 MG
500 CAPSULE ORAL DAILY
Status: ON HOLD | COMMUNITY
End: 2022-05-05 | Stop reason: HOSPADM

## 2022-04-30 RX ORDER — BACLOFEN 10 MG/1
10 TABLET ORAL 3 TIMES DAILY
Status: ON HOLD | COMMUNITY
End: 2022-05-05 | Stop reason: SDUPTHER

## 2022-04-30 RX ORDER — CLOPIDOGREL BISULFATE 75 MG/1
75 TABLET ORAL DAILY
Status: DISCONTINUED | OUTPATIENT
Start: 2022-05-01 | End: 2022-05-06 | Stop reason: HOSPADM

## 2022-04-30 RX ORDER — HYDROMORPHONE HYDROCHLORIDE 2 MG/ML
1 INJECTION, SOLUTION INTRAMUSCULAR; INTRAVENOUS; SUBCUTANEOUS EVERY 4 HOURS PRN
Status: DISCONTINUED | OUTPATIENT
Start: 2022-05-01 | End: 2022-05-06 | Stop reason: HOSPADM

## 2022-04-30 RX ORDER — LEVOTHYROXINE SODIUM 150 UG/1
150 TABLET ORAL
Status: DISCONTINUED | OUTPATIENT
Start: 2022-05-01 | End: 2022-05-06 | Stop reason: HOSPADM

## 2022-04-30 RX ORDER — LEVOTHYROXINE SODIUM 125 UG/1
125 TABLET ORAL
COMMUNITY

## 2022-04-30 RX ORDER — IPRATROPIUM BROMIDE AND ALBUTEROL SULFATE 2.5; .5 MG/3ML; MG/3ML
3 SOLUTION RESPIRATORY (INHALATION) EVERY 6 HOURS
Status: DISCONTINUED | OUTPATIENT
Start: 2022-05-01 | End: 2022-05-06 | Stop reason: HOSPADM

## 2022-04-30 RX ORDER — METHYL SALICYLATE
LIQUID (ML) TOPICAL 3 TIMES DAILY
Status: ON HOLD | COMMUNITY
End: 2022-05-05 | Stop reason: HOSPADM

## 2022-04-30 RX ORDER — NAPROXEN SODIUM 220 MG/1
81 TABLET, FILM COATED ORAL DAILY
Status: DISCONTINUED | OUTPATIENT
Start: 2022-05-01 | End: 2022-04-30

## 2022-04-30 RX ORDER — BACLOFEN 10 MG/1
10 TABLET ORAL 3 TIMES DAILY
Status: DISCONTINUED | OUTPATIENT
Start: 2022-05-01 | End: 2022-05-06 | Stop reason: HOSPADM

## 2022-04-30 RX ORDER — ALENDRONATE SODIUM 70 MG/1
70 TABLET ORAL
Status: ON HOLD | COMMUNITY
End: 2022-05-05 | Stop reason: HOSPADM

## 2022-04-30 RX ORDER — CLOPIDOGREL BISULFATE 75 MG/1
75 TABLET ORAL DAILY
Status: ON HOLD | COMMUNITY
End: 2022-05-05 | Stop reason: HOSPADM

## 2022-04-30 RX ORDER — DOCUSATE SODIUM 100 MG/1
100 CAPSULE, LIQUID FILLED ORAL DAILY
Status: ON HOLD | COMMUNITY
End: 2022-05-05 | Stop reason: HOSPADM

## 2022-04-30 RX ORDER — NAPROXEN SODIUM 220 MG/1
81 TABLET, FILM COATED ORAL DAILY
Status: DISCONTINUED | OUTPATIENT
Start: 2022-05-01 | End: 2022-05-06 | Stop reason: HOSPADM

## 2022-04-30 RX ORDER — BISACODYL 10 MG
10 SUPPOSITORY, RECTAL RECTAL DAILY PRN
Status: DISCONTINUED | OUTPATIENT
Start: 2022-04-30 | End: 2022-05-06 | Stop reason: HOSPADM

## 2022-04-30 RX ORDER — PANTOPRAZOLE SODIUM 40 MG/10ML
40 INJECTION, POWDER, LYOPHILIZED, FOR SOLUTION INTRAVENOUS DAILY
Status: DISCONTINUED | OUTPATIENT
Start: 2022-05-01 | End: 2022-05-06 | Stop reason: HOSPADM

## 2022-04-30 RX ORDER — CIPROFLOXACIN HYDROCHLORIDE 3 MG/ML
2 SOLUTION/ DROPS OPHTHALMIC EVERY 4 HOURS
Status: DISCONTINUED | OUTPATIENT
Start: 2022-05-01 | End: 2022-05-06 | Stop reason: HOSPADM

## 2022-04-30 RX ORDER — BISACODYL 10 MG
10 SUPPOSITORY, RECTAL RECTAL DAILY PRN
Status: ON HOLD | COMMUNITY
End: 2022-05-05 | Stop reason: HOSPADM

## 2022-04-30 RX ORDER — SERTRALINE HYDROCHLORIDE 50 MG/1
100 TABLET, FILM COATED ORAL DAILY
Status: DISCONTINUED | OUTPATIENT
Start: 2022-05-01 | End: 2022-05-06 | Stop reason: HOSPADM

## 2022-04-30 RX ORDER — OMEPRAZOLE 40 MG/1
40 CAPSULE, DELAYED RELEASE ORAL DAILY
COMMUNITY

## 2022-04-30 RX ORDER — ENOXAPARIN SODIUM 100 MG/ML
40 INJECTION SUBCUTANEOUS DAILY
Status: DISCONTINUED | OUTPATIENT
Start: 2022-05-01 | End: 2022-05-06 | Stop reason: HOSPADM

## 2022-04-30 RX ORDER — UBIDECARENONE 75 MG
500 CAPSULE ORAL DAILY
Status: DISCONTINUED | OUTPATIENT
Start: 2022-05-01 | End: 2022-05-06 | Stop reason: HOSPADM

## 2022-04-30 RX ORDER — CHOLECALCIFEROL (VITAMIN D3) 25 MCG
1000 TABLET ORAL DAILY
Status: DISCONTINUED | OUTPATIENT
Start: 2022-05-01 | End: 2022-05-06 | Stop reason: HOSPADM

## 2022-04-30 RX ORDER — TAMSULOSIN HYDROCHLORIDE 0.4 MG/1
0.4 CAPSULE ORAL DAILY
Status: DISCONTINUED | OUTPATIENT
Start: 2022-05-01 | End: 2022-05-06 | Stop reason: HOSPADM

## 2022-04-30 RX ORDER — NAPROXEN SODIUM 220 MG/1
81 TABLET, FILM COATED ORAL DAILY
Status: ON HOLD | COMMUNITY
End: 2022-05-05 | Stop reason: HOSPADM

## 2022-04-30 RX ORDER — NITROFURANTOIN 25; 75 MG/1; MG/1
100 CAPSULE ORAL DAILY
Status: ON HOLD | COMMUNITY
End: 2022-05-05 | Stop reason: HOSPADM

## 2022-04-30 RX ORDER — VIT C/E/ZN/COPPR/LUTEIN/ZEAXAN 250MG-90MG
1000 CAPSULE ORAL DAILY
Status: ON HOLD | COMMUNITY
End: 2022-05-05 | Stop reason: HOSPADM

## 2022-04-30 RX ORDER — ALBUTEROL SULFATE 0.83 MG/ML
2.5 SOLUTION RESPIRATORY (INHALATION) EVERY 6 HOURS
Status: DISCONTINUED | OUTPATIENT
Start: 2022-05-01 | End: 2022-04-30

## 2022-04-30 RX ORDER — SERTRALINE HYDROCHLORIDE 100 MG/1
100 TABLET, FILM COATED ORAL DAILY
COMMUNITY

## 2022-04-30 RX ORDER — DEXTROSE MONOHYDRATE 50 MG/ML
INJECTION, SOLUTION INTRAVENOUS CONTINUOUS
Status: DISCONTINUED | OUTPATIENT
Start: 2022-05-01 | End: 2022-05-06 | Stop reason: HOSPADM

## 2022-04-30 RX ORDER — ACETAMINOPHEN 325 MG/1
650 TABLET ORAL 2 TIMES DAILY PRN
Status: ON HOLD | COMMUNITY
End: 2022-05-05 | Stop reason: HOSPADM

## 2022-04-30 RX ORDER — DOCUSATE SODIUM 100 MG/1
100 CAPSULE, LIQUID FILLED ORAL DAILY
Status: DISCONTINUED | OUTPATIENT
Start: 2022-05-01 | End: 2022-05-06 | Stop reason: HOSPADM

## 2022-04-30 RX ORDER — FUROSEMIDE 10 MG/ML
20 INJECTION INTRAMUSCULAR; INTRAVENOUS DAILY
Status: DISCONTINUED | OUTPATIENT
Start: 2022-05-01 | End: 2022-05-06 | Stop reason: HOSPADM

## 2022-04-30 RX ORDER — CLINDAMYCIN PHOSPHATE 600 MG/50ML
600 INJECTION, SOLUTION INTRAVENOUS
Status: DISCONTINUED | OUTPATIENT
Start: 2022-05-01 | End: 2022-05-06 | Stop reason: HOSPADM

## 2022-04-30 RX ORDER — METOPROLOL TARTRATE 1 MG/ML
2.5 INJECTION, SOLUTION INTRAVENOUS EVERY 6 HOURS
Status: DISCONTINUED | OUTPATIENT
Start: 2022-05-01 | End: 2022-05-03

## 2022-04-30 RX ORDER — TAMSULOSIN HYDROCHLORIDE 0.4 MG/1
0.4 CAPSULE ORAL DAILY
Status: ON HOLD | COMMUNITY
End: 2022-05-05 | Stop reason: HOSPADM

## 2022-04-30 RX ORDER — OLANZAPINE 5 MG/1
5 TABLET, ORALLY DISINTEGRATING ORAL 2 TIMES DAILY
Status: DISCONTINUED | OUTPATIENT
Start: 2022-04-30 | End: 2022-05-06 | Stop reason: HOSPADM

## 2022-04-30 RX ORDER — LINEZOLID 2 MG/ML
600 INJECTION, SOLUTION INTRAVENOUS 2 TIMES DAILY
Status: DISCONTINUED | OUTPATIENT
Start: 2022-05-01 | End: 2022-05-06 | Stop reason: HOSPADM

## 2022-05-01 PROBLEM — E87.6 HYPOKALEMIA: Status: ACTIVE | Noted: 2022-05-01

## 2022-05-01 PROBLEM — A41.9: Status: ACTIVE | Noted: 2022-05-01

## 2022-05-01 PROBLEM — R50.9: Status: ACTIVE | Noted: 2022-05-01

## 2022-05-01 PROBLEM — R10.9 ACUTE ABDOMINAL PAIN: Status: ACTIVE | Noted: 2022-05-01

## 2022-05-01 PROBLEM — G93.41 METABOLIC ENCEPHALOPATHY: Chronic | Status: ACTIVE | Noted: 2022-05-01

## 2022-05-01 PROBLEM — I50.23 ACUTE ON CHRONIC SYSTOLIC HEART FAILURE: Chronic | Status: ACTIVE | Noted: 2022-05-01

## 2022-05-01 PROCEDURE — 25000003 PHARM REV CODE 250

## 2022-05-01 PROCEDURE — 25000242 PHARM REV CODE 250 ALT 637 W/ HCPCS

## 2022-05-01 PROCEDURE — 27000221 HC OXYGEN, UP TO 24 HOURS

## 2022-05-01 PROCEDURE — 63600175 PHARM REV CODE 636 W HCPCS

## 2022-05-01 PROCEDURE — 25000003 PHARM REV CODE 250: Performed by: INTERNAL MEDICINE

## 2022-05-01 PROCEDURE — 84100 ASSAY OF PHOSPHORUS: CPT

## 2022-05-01 PROCEDURE — 99999 HC NO LEVEL OF SERVICE - ED ONLY: CPT

## 2022-05-01 PROCEDURE — 94761 N-INVAS EAR/PLS OXIMETRY MLT: CPT

## 2022-05-01 PROCEDURE — 94640 AIRWAY INHALATION TREATMENT: CPT

## 2022-05-01 PROCEDURE — 36415 COLL VENOUS BLD VENIPUNCTURE: CPT

## 2022-05-01 PROCEDURE — 85027 COMPLETE CBC AUTOMATED: CPT

## 2022-05-01 PROCEDURE — C9113 INJ PANTOPRAZOLE SODIUM, VIA: HCPCS

## 2022-05-01 PROCEDURE — 20000000 HC ICU ROOM

## 2022-05-01 PROCEDURE — 80053 COMPREHEN METABOLIC PANEL: CPT

## 2022-05-01 PROCEDURE — 99990 CHARGE CONVERSION: CPT

## 2022-05-01 PROCEDURE — 83735 ASSAY OF MAGNESIUM: CPT

## 2022-05-01 PROCEDURE — 85007 BL SMEAR W/DIFF WBC COUNT: CPT

## 2022-05-01 RX ORDER — POTASSIUM CHLORIDE 20 MEQ/1
40 TABLET, EXTENDED RELEASE ORAL 2 TIMES DAILY
Status: DISCONTINUED | OUTPATIENT
Start: 2022-05-01 | End: 2022-05-06 | Stop reason: HOSPADM

## 2022-05-01 RX ORDER — POTASSIUM CHLORIDE 1.5 G/1.58G
POWDER, FOR SOLUTION ORAL
Status: COMPLETED
Start: 2022-05-01 | End: 2022-05-01

## 2022-05-01 RX ADMIN — CIPROFLOXACIN 2 DROP: 3 SOLUTION OPHTHALMIC at 02:05

## 2022-05-01 RX ADMIN — LINEZOLID 600 MG: 600 INJECTION, SOLUTION INTRAVENOUS at 09:05

## 2022-05-01 RX ADMIN — METOPROLOL TARTRATE 2.5 MG: 5 INJECTION, SOLUTION INTRAVENOUS at 05:05

## 2022-05-01 RX ADMIN — MULTIPLE VITAMINS W/ MINERALS TAB 1 TABLET: TAB at 09:05

## 2022-05-01 RX ADMIN — CLOPIDOGREL BISULFATE 75 MG: 75 TABLET, FILM COATED ORAL at 09:05

## 2022-05-01 RX ADMIN — CLINDAMYCIN PHOSPHATE 600 MG: 600 INJECTION, SOLUTION INTRAVENOUS at 03:05

## 2022-05-01 RX ADMIN — CIPROFLOXACIN 2 DROP: 3 SOLUTION OPHTHALMIC at 10:05

## 2022-05-01 RX ADMIN — OLANZAPINE 5 MG: 5 TABLET, ORALLY DISINTEGRATING ORAL at 09:05

## 2022-05-01 RX ADMIN — CIPROFLOXACIN 2 DROP: 3 SOLUTION OPHTHALMIC at 05:05

## 2022-05-01 RX ADMIN — PIPERACILLIN SODIUM AND TAZOBACTAM SODIUM 4.5 G: 4; 500 INJECTION, POWDER, FOR SOLUTION INTRAVENOUS at 08:05

## 2022-05-01 RX ADMIN — BACLOFEN 10 MG: 10 TABLET ORAL at 03:05

## 2022-05-01 RX ADMIN — LEVOTHYROXINE SODIUM 150 MCG: 150 TABLET ORAL at 06:05

## 2022-05-01 RX ADMIN — PIPERACILLIN SODIUM AND TAZOBACTAM SODIUM 4.5 G: 4; 500 INJECTION, POWDER, FOR SOLUTION INTRAVENOUS at 03:05

## 2022-05-01 RX ADMIN — IPRATROPIUM BROMIDE AND ALBUTEROL SULFATE 3 ML: 2.5; .5 SOLUTION RESPIRATORY (INHALATION) at 07:05

## 2022-05-01 RX ADMIN — PIPERACILLIN SODIUM AND TAZOBACTAM SODIUM 4.5 G: 4; 500 INJECTION, POWDER, FOR SOLUTION INTRAVENOUS at 11:05

## 2022-05-01 RX ADMIN — PANTOPRAZOLE SODIUM 40 MG: 40 INJECTION, POWDER, FOR SOLUTION INTRAVENOUS at 09:05

## 2022-05-01 RX ADMIN — BACLOFEN 10 MG: 10 TABLET ORAL at 09:05

## 2022-05-01 RX ADMIN — CIPROFLOXACIN 2 DROP: 3 SOLUTION OPHTHALMIC at 06:05

## 2022-05-01 RX ADMIN — CYANOCOBALAMIN TAB 500 MCG 500 MCG: 500 TAB at 09:05

## 2022-05-01 RX ADMIN — POTASSIUM CHLORIDE FOR ORAL SOLUTION 40 MEQ: 1.5 POWDER, FOR SOLUTION ORAL at 08:05

## 2022-05-01 RX ADMIN — TAMSULOSIN HYDROCHLORIDE 0.4 MG: 0.4 CAPSULE ORAL at 09:05

## 2022-05-01 RX ADMIN — METOPROLOL TARTRATE 2.5 MG: 5 INJECTION, SOLUTION INTRAVENOUS at 12:05

## 2022-05-01 RX ADMIN — SERTRALINE HYDROCHLORIDE 100 MG: 50 TABLET ORAL at 09:05

## 2022-05-01 RX ADMIN — OLANZAPINE 5 MG: 5 TABLET, ORALLY DISINTEGRATING ORAL at 08:05

## 2022-05-01 RX ADMIN — BACLOFEN 10 MG: 10 TABLET ORAL at 08:05

## 2022-05-01 RX ADMIN — CLINDAMYCIN PHOSPHATE 600 MG: 600 INJECTION, SOLUTION INTRAVENOUS at 06:05

## 2022-05-01 RX ADMIN — FUROSEMIDE 20 MG: 10 INJECTION INTRAMUSCULAR; INTRAVENOUS at 11:05

## 2022-05-01 RX ADMIN — LINEZOLID 600 MG: 600 INJECTION, SOLUTION INTRAVENOUS at 08:05

## 2022-05-01 RX ADMIN — ASPIRIN 81 MG 81 MG: 81 TABLET ORAL at 09:05

## 2022-05-01 RX ADMIN — ENOXAPARIN SODIUM 40 MG: 100 INJECTION SUBCUTANEOUS at 09:05

## 2022-05-01 RX ADMIN — METOPROLOL TARTRATE 2.5 MG: 5 INJECTION, SOLUTION INTRAVENOUS at 11:05

## 2022-05-01 RX ADMIN — CLINDAMYCIN PHOSPHATE 600 MG: 600 INJECTION, SOLUTION INTRAVENOUS at 11:05

## 2022-05-01 RX ADMIN — Medication 1000 UNITS: at 09:05

## 2022-05-01 RX ADMIN — DEXTROSE MONOHYDRATE: 50 INJECTION, SOLUTION INTRAVENOUS at 09:05

## 2022-05-01 RX ADMIN — DEXTROSE MONOHYDRATE: 50 INJECTION, SOLUTION INTRAVENOUS at 02:05

## 2022-05-01 RX ADMIN — METOPROLOL TARTRATE 2.5 MG: 5 INJECTION, SOLUTION INTRAVENOUS at 06:05

## 2022-05-01 RX ADMIN — IPRATROPIUM BROMIDE AND ALBUTEROL SULFATE 3 ML: 2.5; .5 SOLUTION RESPIRATORY (INHALATION) at 01:05

## 2022-05-01 NOTE — PROGRESS NOTES
[facility] - ICU  Hospital Medicine  Progress Note    Patient Name: Tejas Navas  MRN: [id number]  Patient Class: IP- Inpatient   Admission Date: 4/25/2022  Length of Stay: 6 days  Attending Physician: Navin Lopez III, MD  Primary Care Provider: Navin Lopez III, MD        Subjective:     Principal Problem:Acute abdominal pain        HPI:  He  is   better  today   More alert.   He  has  not  gotten  many  opiates in last  24  hours and  narcan has helped   Him be more alert   Not  as many secretions      Overview/Hospital Course:  No notes on file    No new subjective & objective note has been filed under this hospital service since the last note was generated.      Assessment/Plan:      * Acute abdominal pain    Will cont  Tx  Constipation       Hypokalemia    Replace   k     w  Iv and   Through  g tube    Acute on chronic systolic heart failure    Iv  Beta blocker and  Monitor  His   Fluid status     Septicemic  On levaquin and zosyn       Metabolic encephalopathy  Have  Held off on dilaudid as much as possible  Due  To resp  suppresion   Pt is  dnr       Increased body temperature  Treat underlying infection         VTE Risk Mitigation (From admission, onward)         Ordered     enoxaparin injection 40 mg  Daily         04/30/22 2058                Discharge Planning   ISSA:      Code Status: DNR   Is the patient medically ready for discharge?: No    Reason for patient still in hospital (select all that apply): Patient unstable                     Shanon Ramsey MD  Department of Hospital Medicine   [facility] - ICU

## 2022-05-01 NOTE — ACP (ADVANCE CARE PLANNING)
Advance Care Planning     Date: 05/01/2022    Today a meeting took place: ICU    Patient Participation: Patient is unable to participate     Attendees (Name and  Relationship to patient): 3 marisa samayoa mack     Staff attendees (Name and  Role):matilda  icu  Nurse     ACP Conversation (General): Understanding of advance care planning and role of health care agent defined .    Code Status: DNR; status confirmed/order placed in chart     ACP Documents: [place]: Started new form    Goals of care: The patient endorses that what is most important right now is to focus on symptom/pain control    Accordingly, we have decided that the best plan to meet the patient's goals includes continuing with treatment      Recommendations/  Follow-up tasks: The patient and health care agent were provided the following recommendations they  may  visit  him ICU bc  he is aware       Length of ACP   conversation in minutes: 15minutes

## 2022-05-01 NOTE — PROGRESS NOTES
[facility] - ICU Hospital Medicine  Progress Note    Patient Name: Tejas Navas  MRN: [id number]  Patient Class: IP- Inpatient   Admission Date: 4/25/2022  Length of Stay: 6 days  Attending Physician: Navin Lopez III, MD  Primary Care Provider: Navin Lopez III, MD        Subjective:     Principal Problem:Acute abdominal pain        HPI:  He  is   better  today   More alert.   He  has  not  gotten  many  opiates in last  24  hours and  narcan has helped   Him be more alert   Not  as many secretions      Overview/Hospital Course:  No notes on file    Interval History: .    Review of Systems  Objective:     Vital Signs (Most Recent):  Temp: 97.3 °F (36.3 °C) (05/01/22 1500)  Pulse: 81 (05/01/22 1800)  Resp: (!) 24 (05/01/22 1800)  BP: 105/66 (05/01/22 1800)  SpO2: 99 % (05/01/22 1800)   Vital Signs (24h Range):  Temp:  [97 °F (36.1 °C)-97.3 °F (36.3 °C)] 97.3 °F (36.3 °C)  Pulse:  [] 81  Resp:  [17-28] 24  SpO2:  [79 %-100 %] 99 %  BP: ()/(57-90) 105/66     Weight: 54.2 kg (119 lb 7.8 oz)  Body mass index is 17.3 kg/m².    Intake/Output Summary (Last 24 hours) at 5/1/2022 1807  Last data filed at 5/1/2022 1700  Gross per 24 hour   Intake 793 ml   Output 1650 ml   Net -857 ml      Physical Exam  Vitals reviewed.   Constitutional:       Comments: Nurse, henri rounded  w me  Pt  very  chacectic.   He  is  alert  but  not  oriented   HENT:      Head:      Comments: Small head   Very recessed  jaw     Nose:      Comments: Nasal  trumput     Pulmonary:      Breath sounds: Wheezing and rhonchi present.      Comments: More relaxed breathing  than  yesterday  Musculoskeletal:         General: Deformity present.      Comments: conractures       Significant Labs: All pertinent labs within the past 24 hours have been reviewed.  BMP: No results for input(s): GLU, NA, K, CL, CO2, BUN, CREATININE, CALCIUM, MG in the last 48 hours.    Significant Imaging: I have reviewed all pertinent imaging  results/findings within the past 24 hours.      Assessment/Plan:      No notes have been filed under this hospital service.  Service: Hospital Medicine    VTE Risk Mitigation (From admission, onward)         Ordered     enoxaparin injection 40 mg  Daily         04/30/22 2058                Discharge Planning   ISSA:      Code Status: DNR   Is the patient medically ready for discharge?:     Reason for patient still in hospital (select all that apply): Patient unstable                      Shanon Ramsey MD  Department of Hospital Medicine   [facility] - ICU

## 2022-05-01 NOTE — HPI
He  is   better  today   More alert.   He  has  not  gotten  many  opiates in last  24  hours and  narcan has helped   Him be more alert   Not  as many secretions

## 2022-05-01 NOTE — SUBJECTIVE & OBJECTIVE
Interval History: .    Review of Systems  Objective:     Vital Signs (Most Recent):  Temp: 97.3 °F (36.3 °C) (05/01/22 1500)  Pulse: 81 (05/01/22 1800)  Resp: (!) 24 (05/01/22 1800)  BP: 105/66 (05/01/22 1800)  SpO2: 99 % (05/01/22 1800)   Vital Signs (24h Range):  Temp:  [97 °F (36.1 °C)-97.3 °F (36.3 °C)] 97.3 °F (36.3 °C)  Pulse:  [] 81  Resp:  [17-28] 24  SpO2:  [79 %-100 %] 99 %  BP: ()/(57-90) 105/66     Weight: 54.2 kg (119 lb 7.8 oz)  Body mass index is 17.3 kg/m².    Intake/Output Summary (Last 24 hours) at 5/1/2022 1807  Last data filed at 5/1/2022 1700  Gross per 24 hour   Intake 793 ml   Output 1650 ml   Net -857 ml      Physical Exam  Vitals reviewed.   Constitutional:       Comments: Nurse, henri jaramillo  w me  Pt  very  chacectic.   He  is  alert  but  not  oriented   HENT:      Head:      Comments: Small head   Very recessed  jaw     Nose:      Comments: Nasal  trumput     Pulmonary:      Breath sounds: Wheezing and rhonchi present.      Comments: More relaxed breathing  than  yesterday  Musculoskeletal:         General: Deformity present.      Comments: conractures       Significant Labs: All pertinent labs within the past 24 hours have been reviewed.  BMP: No results for input(s): GLU, NA, K, CL, CO2, BUN, CREATININE, CALCIUM, MG in the last 48 hours.    Significant Imaging: I have reviewed all pertinent imaging results/findings within the past 24 hours.

## 2022-05-02 LAB
ALBUMIN SERPL-MCNC: 2 GM/DL (ref 3.4–4.8)
ALBUMIN/GLOB SERPL: 0.4 RATIO (ref 1.1–2)
ALP SERPL-CCNC: 170 UNIT/L (ref 40–150)
ALT SERPL-CCNC: 84 UNIT/L (ref 0–55)
AST SERPL-CCNC: 83 UNIT/L (ref 5–34)
BASOPHILS # BLD AUTO: 0.06 X10(3)/MCL (ref 0–0.2)
BASOPHILS NFR BLD AUTO: 0.4 %
BILIRUBIN DIRECT+TOT PNL SERPL-MCNC: 0.3 MG/DL (ref 0–0.5)
BILIRUBIN DIRECT+TOT PNL SERPL-MCNC: 0.6 MG/DL (ref 0–0.8)
BILIRUBIN DIRECT+TOT PNL SERPL-MCNC: 0.9 MG/DL
BUN SERPL-MCNC: 17 MG/DL (ref 8.4–25.7)
CALCIUM SERPL-MCNC: 8.9 MG/DL (ref 8.8–10)
CHLORIDE SERPL-SCNC: 105 MMOL/L (ref 98–107)
CO2 SERPL-SCNC: 22 MMOL/L (ref 23–31)
CREAT SERPL-MCNC: 1.02 MG/DL (ref 0.73–1.18)
EOSINOPHIL # BLD AUTO: 0.2 X10(3)/MCL (ref 0–0.9)
EOSINOPHIL NFR BLD AUTO: 1.5 %
ERYTHROCYTE [DISTWIDTH] IN BLOOD BY AUTOMATED COUNT: 14.5 % (ref 11.5–17)
GLOBULIN SER-MCNC: 5.4 GM/DL (ref 2.4–3.5)
GLUCOSE SERPL-MCNC: 110 MG/DL (ref 82–115)
HCT VFR BLD AUTO: 38 % (ref 42–52)
HGB BLD-MCNC: 12.2 GM/DL (ref 14–18)
HYPOCHROMIA BLD QL SMEAR: SLIGHT
IMM GRANULOCYTES # BLD AUTO: 0.07 X10(3)/MCL (ref 0–0.02)
IMM GRANULOCYTES NFR BLD AUTO: 0.5 % (ref 0–0.43)
LYMPHOCYTES # BLD AUTO: 0.67 X10(3)/MCL (ref 0.6–4.6)
LYMPHOCYTES NFR BLD AUTO: 4.9 %
LYMPHOCYTES NFR BLD MANUAL: 3 % (ref 13–40)
LYMPHOCYTES NFR BLD MANUAL: 411 /MCL (ref 3400–13700)
MAGNESIUM SERPL-MCNC: 1.9 MG/DL (ref 1.6–2.6)
MCH RBC QN AUTO: 30 PG (ref 27–31)
MCHC RBC AUTO-ENTMCNC: 32.1 MG/DL (ref 33–36)
MCV RBC AUTO: 93.6 FL (ref 80–94)
MONOCYTES # BLD AUTO: 0.4 X10(3)/MCL (ref 0.1–1.3)
MONOCYTES NFR BLD AUTO: 2.9 %
MONOCYTES NFR BLD MANUAL: 4 % (ref 2–11)
MONOCYTES NFR BLD MANUAL: 548 /MCL (ref 100–1300)
NEUTROPHILS # BLD AUTO: 12.3 X10(3)/MCL (ref 2.1–9.2)
NEUTROPHILS NFR BLD AUTO: 89.8 %
NEUTROPHILS NFR BLD MANUAL: 93 % (ref 47–80)
PLATELET # BLD AUTO: 246 X10(3)/MCL (ref 130–400)
PLATELET # BLD EST: ADEQUATE 10*3/UL
PMV BLD AUTO: 11.9 FL (ref 9.4–12.4)
POTASSIUM SERPL-SCNC: 3.3 MMOL/L (ref 3.5–5.1)
PROT SERPL-MCNC: 7.4 GM/DL (ref 5.8–7.6)
RBC # BLD AUTO: 4.06 X10(6)/MCL (ref 4.7–6.1)
RBC MORPH BLD: ABNORMAL
SODIUM SERPL-SCNC: 140 MMOL/L (ref 136–145)
WBC # SPEC AUTO: 13.7 X10(3)/MCL (ref 4.5–11.5)

## 2022-05-02 PROCEDURE — 27000221 HC OXYGEN, UP TO 24 HOURS

## 2022-05-02 PROCEDURE — 83735 ASSAY OF MAGNESIUM: CPT | Performed by: FAMILY MEDICINE

## 2022-05-02 PROCEDURE — 11000001 HC ACUTE MED/SURG PRIVATE ROOM

## 2022-05-02 PROCEDURE — C9113 INJ PANTOPRAZOLE SODIUM, VIA: HCPCS

## 2022-05-02 PROCEDURE — 36415 COLL VENOUS BLD VENIPUNCTURE: CPT | Performed by: FAMILY MEDICINE

## 2022-05-02 PROCEDURE — 94640 AIRWAY INHALATION TREATMENT: CPT

## 2022-05-02 PROCEDURE — 94761 N-INVAS EAR/PLS OXIMETRY MLT: CPT

## 2022-05-02 PROCEDURE — 63600175 PHARM REV CODE 636 W HCPCS

## 2022-05-02 PROCEDURE — 25000003 PHARM REV CODE 250: Performed by: INTERNAL MEDICINE

## 2022-05-02 PROCEDURE — 25000242 PHARM REV CODE 250 ALT 637 W/ HCPCS

## 2022-05-02 PROCEDURE — 25000003 PHARM REV CODE 250

## 2022-05-02 PROCEDURE — 21400001 HC TELEMETRY ROOM

## 2022-05-02 PROCEDURE — 85025 COMPLETE CBC W/AUTO DIFF WBC: CPT | Performed by: FAMILY MEDICINE

## 2022-05-02 PROCEDURE — 99990 CHARGE CONVERSION: CPT

## 2022-05-02 PROCEDURE — 80053 COMPREHEN METABOLIC PANEL: CPT | Performed by: FAMILY MEDICINE

## 2022-05-02 PROCEDURE — 25000003 PHARM REV CODE 250: Performed by: FAMILY MEDICINE

## 2022-05-02 RX ORDER — HYDROCODONE BITARTRATE AND ACETAMINOPHEN 5; 325 MG/1; MG/1
1 TABLET ORAL
Status: DISCONTINUED | OUTPATIENT
Start: 2022-05-02 | End: 2022-05-06 | Stop reason: HOSPADM

## 2022-05-02 RX ORDER — POTASSIUM CHLORIDE 750 MG/1
TABLET, EXTENDED RELEASE ORAL
Status: DISPENSED
Start: 2022-05-02 | End: 2022-05-02

## 2022-05-02 RX ADMIN — FUROSEMIDE 20 MG: 10 INJECTION INTRAMUSCULAR; INTRAVENOUS at 09:05

## 2022-05-02 RX ADMIN — CIPROFLOXACIN 2 DROP: 3 SOLUTION OPHTHALMIC at 06:05

## 2022-05-02 RX ADMIN — CLINDAMYCIN PHOSPHATE 600 MG: 600 INJECTION, SOLUTION INTRAVENOUS at 12:05

## 2022-05-02 RX ADMIN — CIPROFLOXACIN 2 DROP: 3 SOLUTION OPHTHALMIC at 05:05

## 2022-05-02 RX ADMIN — METOPROLOL TARTRATE 2.5 MG: 5 INJECTION, SOLUTION INTRAVENOUS at 05:05

## 2022-05-02 RX ADMIN — CIPROFLOXACIN 2 DROP: 3 SOLUTION OPHTHALMIC at 02:05

## 2022-05-02 RX ADMIN — CLINDAMYCIN PHOSPHATE 600 MG: 600 INJECTION, SOLUTION INTRAVENOUS at 03:05

## 2022-05-02 RX ADMIN — CLINDAMYCIN PHOSPHATE 600 MG: 600 INJECTION, SOLUTION INTRAVENOUS at 06:05

## 2022-05-02 RX ADMIN — POTASSIUM CHLORIDE 40 MEQ: 750 TABLET, EXTENDED RELEASE ORAL at 09:05

## 2022-05-02 RX ADMIN — PIPERACILLIN SODIUM AND TAZOBACTAM SODIUM 4.5 G: 4; 500 INJECTION, POWDER, FOR SOLUTION INTRAVENOUS at 03:05

## 2022-05-02 RX ADMIN — METOPROLOL TARTRATE 2.5 MG: 5 INJECTION, SOLUTION INTRAVENOUS at 12:05

## 2022-05-02 RX ADMIN — IPRATROPIUM BROMIDE AND ALBUTEROL SULFATE 3 ML: 2.5; .5 SOLUTION RESPIRATORY (INHALATION) at 01:05

## 2022-05-02 RX ADMIN — CIPROFLOXACIN 2 DROP: 3 SOLUTION OPHTHALMIC at 10:05

## 2022-05-02 RX ADMIN — LINEZOLID 600 MG: 600 INJECTION, SOLUTION INTRAVENOUS at 10:05

## 2022-05-02 RX ADMIN — DOCUSATE SODIUM 100 MG: 100 CAPSULE, LIQUID FILLED ORAL at 09:05

## 2022-05-02 RX ADMIN — MULTIPLE VITAMINS W/ MINERALS TAB 1 TABLET: TAB at 09:05

## 2022-05-02 RX ADMIN — ASPIRIN 81 MG 81 MG: 81 TABLET ORAL at 09:05

## 2022-05-02 RX ADMIN — IPRATROPIUM BROMIDE AND ALBUTEROL SULFATE 3 ML: 2.5; .5 SOLUTION RESPIRATORY (INHALATION) at 07:05

## 2022-05-02 RX ADMIN — Medication 1000 UNITS: at 09:05

## 2022-05-02 RX ADMIN — BACLOFEN 10 MG: 10 TABLET ORAL at 09:05

## 2022-05-02 RX ADMIN — LEVOTHYROXINE SODIUM 150 MCG: 150 TABLET ORAL at 05:05

## 2022-05-02 RX ADMIN — OLANZAPINE 5 MG: 5 TABLET, ORALLY DISINTEGRATING ORAL at 09:05

## 2022-05-02 RX ADMIN — PIPERACILLIN SODIUM AND TAZOBACTAM SODIUM 4.5 G: 4; 500 INJECTION, POWDER, FOR SOLUTION INTRAVENOUS at 10:05

## 2022-05-02 RX ADMIN — CLOPIDOGREL BISULFATE 75 MG: 75 TABLET, FILM COATED ORAL at 09:05

## 2022-05-02 RX ADMIN — CIPROFLOXACIN 2 DROP: 3 SOLUTION OPHTHALMIC at 09:05

## 2022-05-02 RX ADMIN — TAMSULOSIN HYDROCHLORIDE 0.4 MG: 0.4 CAPSULE ORAL at 09:05

## 2022-05-02 RX ADMIN — CIPROFLOXACIN 2 DROP: 3 SOLUTION OPHTHALMIC at 01:05

## 2022-05-02 RX ADMIN — SERTRALINE HYDROCHLORIDE 100 MG: 50 TABLET ORAL at 09:05

## 2022-05-02 RX ADMIN — PANTOPRAZOLE SODIUM 40 MG: 40 INJECTION, POWDER, FOR SOLUTION INTRAVENOUS at 09:05

## 2022-05-02 RX ADMIN — CYANOCOBALAMIN TAB 500 MCG 500 MCG: 500 TAB at 09:05

## 2022-05-02 RX ADMIN — ENOXAPARIN SODIUM 40 MG: 100 INJECTION SUBCUTANEOUS at 09:05

## 2022-05-02 RX ADMIN — BACLOFEN 10 MG: 10 TABLET ORAL at 02:05

## 2022-05-02 NOTE — SUBJECTIVE & OBJECTIVE
Interval History: . .    Review of Systems   Constitutional:  Positive for activity change. Negative for appetite change, chills, diaphoresis, fatigue, fever and unexpected weight change.   HENT:  Negative for congestion, ear pain, facial swelling, nosebleeds, postnasal drip, rhinorrhea, sore throat and tinnitus.    Eyes:  Negative for pain, redness and visual disturbance.   Respiratory:  Negative for apnea, cough, chest tightness, shortness of breath and wheezing.    Cardiovascular:  Negative for chest pain and palpitations.   Gastrointestinal:  Negative for abdominal distention, abdominal pain, anal bleeding, blood in stool, diarrhea, nausea, rectal pain and vomiting.   Endocrine: Negative for cold intolerance and heat intolerance.   Genitourinary:  Negative for difficulty urinating, dysuria, hematuria, penile discharge, penile pain, penile swelling, scrotal swelling and testicular pain.   Musculoskeletal:  Negative for arthralgias, back pain, myalgias and neck pain.   Skin:  Negative for rash.   Neurological:  Negative for dizziness, seizures and light-headedness.   Hematological:  Negative for adenopathy.   Psychiatric/Behavioral:  Negative for agitation, behavioral problems and confusion.    Objective:     Vital Signs (Most Recent):  Temp: 98.7 °F (37.1 °C) (05/02/22 1536)  Pulse: 75 (05/02/22 1536)  Resp: 18 (05/02/22 1536)  BP: 96/60 (05/02/22 1536)  SpO2: 100 % (05/02/22 1536) Vital Signs (24h Range):  Temp:  [97.5 °F (36.4 °C)-100.2 °F (37.9 °C)] 98.7 °F (37.1 °C)  Pulse:  [72-93] 75  Resp:  [18-25] 18  SpO2:  [93 %-100 %] 100 %  BP: ()/(58-85) 96/60     Weight: 54.2 kg (119 lb 7.8 oz)  Body mass index is 17.3 kg/m².    Intake/Output Summary (Last 24 hours) at 5/2/2022 1716  Last data filed at 5/2/2022 0400  Gross per 24 hour   Intake 270 ml   Output 700 ml   Net -430 ml      Physical Exam  Constitutional:       Appearance: Normal appearance. He is normal weight.   HENT:      Head: Normocephalic and  atraumatic.      Right Ear: Tympanic membrane, ear canal and external ear normal.      Left Ear: Tympanic membrane, ear canal and external ear normal.      Nose: No congestion.      Mouth/Throat:      Mouth: Mucous membranes are moist.      Pharynx: Oropharynx is clear. No oropharyngeal exudate.   Eyes:      Extraocular Movements: Extraocular movements intact.      Conjunctiva/sclera: Conjunctivae normal.      Pupils: Pupils are equal, round, and reactive to light.   Cardiovascular:      Rate and Rhythm: Normal rate and regular rhythm.      Pulses: Normal pulses.      Heart sounds: Normal heart sounds. No murmur heard.    No gallop.   Pulmonary:      Effort: Pulmonary effort is normal.   Abdominal:      General: Abdomen is flat. Bowel sounds are normal.      Palpations: Abdomen is soft.   Musculoskeletal:         General: No swelling or tenderness. Normal range of motion.      Cervical back: Normal range of motion. No rigidity or tenderness.   Lymphadenopathy:      Cervical: No cervical adenopathy.   Skin:     General: Skin is warm and dry.   Neurological:      General: No focal deficit present.      Mental Status: He is alert. Mental status is at baseline.      Sensory: No sensory deficit.      Coordination: Coordination normal.   Psychiatric:         Mood and Affect: Mood normal.         Behavior: Behavior normal.         Thought Content: Thought content normal.         Judgment: Judgment normal.       Significant Labs: All pertinent labs within the past 24 hours have been reviewed.    Significant Imaging: I have reviewed all pertinent imaging results/findings within the past 24 hours.  I have reviewed and interpreted all pertinent imaging results/findings within the past 24 hours.

## 2022-05-02 NOTE — PLAN OF CARE
Problem: Adult Inpatient Plan of Care  Goal: Plan of Care Review  Outcome: Ongoing, Progressing  Goal: Patient-Specific Goal (Individualized)  Outcome: Ongoing, Progressing  Goal: Absence of Hospital-Acquired Illness or Injury  Outcome: Ongoing, Progressing  Goal: Optimal Comfort and Wellbeing  Outcome: Ongoing, Progressing  Goal: Readiness for Transition of Care  Outcome: Ongoing, Progressing     Problem: Infection  Goal: Absence of Infection Signs and Symptoms  Outcome: Ongoing, Progressing     Problem: Skin Injury Risk Increased  Goal: Skin Health and Integrity  Outcome: Ongoing, Progressing     Problem: Adjustment to Illness (Sepsis/Septic Shock)  Goal: Optimal Coping  Outcome: Ongoing, Progressing     Problem: Bleeding (Sepsis/Septic Shock)  Goal: Absence of Bleeding  Outcome: Ongoing, Progressing     Problem: Glycemic Control Impaired (Sepsis/Septic Shock)  Goal: Blood Glucose Level Within Desired Range  Outcome: Ongoing, Progressing     Problem: Infection Progression (Sepsis/Septic Shock)  Goal: Absence of Infection Signs and Symptoms  Outcome: Ongoing, Progressing     Problem: Nutrition Impaired (Sepsis/Septic Shock)  Goal: Optimal Nutrition Intake  Outcome: Ongoing, Progressing

## 2022-05-03 PROCEDURE — C1751 CATH, INF, PER/CENT/MIDLINE: HCPCS

## 2022-05-03 PROCEDURE — 21400001 HC TELEMETRY ROOM

## 2022-05-03 PROCEDURE — 94761 N-INVAS EAR/PLS OXIMETRY MLT: CPT

## 2022-05-03 PROCEDURE — 11000001 HC ACUTE MED/SURG PRIVATE ROOM

## 2022-05-03 PROCEDURE — C9113 INJ PANTOPRAZOLE SODIUM, VIA: HCPCS

## 2022-05-03 PROCEDURE — 25000003 PHARM REV CODE 250: Performed by: INTERNAL MEDICINE

## 2022-05-03 PROCEDURE — 94640 AIRWAY INHALATION TREATMENT: CPT

## 2022-05-03 PROCEDURE — A4216 STERILE WATER/SALINE, 10 ML: HCPCS | Performed by: INTERNAL MEDICINE

## 2022-05-03 PROCEDURE — 25000242 PHARM REV CODE 250 ALT 637 W/ HCPCS

## 2022-05-03 PROCEDURE — 25000003 PHARM REV CODE 250

## 2022-05-03 PROCEDURE — 36569 INSJ PICC 5 YR+ W/O IMAGING: CPT

## 2022-05-03 PROCEDURE — 63600175 PHARM REV CODE 636 W HCPCS

## 2022-05-03 PROCEDURE — 25000003 PHARM REV CODE 250: Performed by: FAMILY MEDICINE

## 2022-05-03 PROCEDURE — 27000221 HC OXYGEN, UP TO 24 HOURS

## 2022-05-03 RX ORDER — SODIUM CHLORIDE 0.9 % (FLUSH) 0.9 %
10 SYRINGE (ML) INJECTION
Status: DISCONTINUED | OUTPATIENT
Start: 2022-05-03 | End: 2022-05-06 | Stop reason: HOSPADM

## 2022-05-03 RX ORDER — HYDROCODONE BITARTRATE AND ACETAMINOPHEN 5; 325 MG/1; MG/1
TABLET ORAL
Status: COMPLETED
Start: 2022-05-03 | End: 2022-05-03

## 2022-05-03 RX ORDER — MUPIROCIN 20 MG/G
OINTMENT TOPICAL 2 TIMES DAILY
Status: CANCELLED | OUTPATIENT
Start: 2022-05-03 | End: 2022-05-08

## 2022-05-03 RX ORDER — SODIUM CHLORIDE 0.9 % (FLUSH) 0.9 %
10 SYRINGE (ML) INJECTION EVERY 6 HOURS
Status: DISCONTINUED | OUTPATIENT
Start: 2022-05-03 | End: 2022-05-06 | Stop reason: HOSPADM

## 2022-05-03 RX ADMIN — HYDROCODONE BITARTRATE AND ACETAMINOPHEN 1 TABLET: 5; 325 TABLET ORAL at 02:05

## 2022-05-03 RX ADMIN — ASPIRIN 81 MG 81 MG: 81 TABLET ORAL at 08:05

## 2022-05-03 RX ADMIN — POTASSIUM CHLORIDE 40 MEQ: 750 TABLET, EXTENDED RELEASE ORAL at 11:05

## 2022-05-03 RX ADMIN — SODIUM CHLORIDE, PRESERVATIVE FREE 10 ML: 5 INJECTION INTRAVENOUS at 11:05

## 2022-05-03 RX ADMIN — BACLOFEN 10 MG: 10 TABLET ORAL at 09:05

## 2022-05-03 RX ADMIN — PIPERACILLIN SODIUM AND TAZOBACTAM SODIUM 4.5 G: 4; 500 INJECTION, POWDER, FOR SOLUTION INTRAVENOUS at 11:05

## 2022-05-03 RX ADMIN — LINEZOLID 600 MG: 600 INJECTION, SOLUTION INTRAVENOUS at 09:05

## 2022-05-03 RX ADMIN — IPRATROPIUM BROMIDE AND ALBUTEROL SULFATE 3 ML: 2.5; .5 SOLUTION RESPIRATORY (INHALATION) at 07:05

## 2022-05-03 RX ADMIN — HYDROCODONE BITARTRATE AND ACETAMINOPHEN 1 TABLET: 5; 325 TABLET ORAL at 09:05

## 2022-05-03 RX ADMIN — PIPERACILLIN SODIUM AND TAZOBACTAM SODIUM 4.5 G: 4; 500 INJECTION, POWDER, FOR SOLUTION INTRAVENOUS at 10:05

## 2022-05-03 RX ADMIN — CIPROFLOXACIN 2 DROP: 3 SOLUTION OPHTHALMIC at 05:05

## 2022-05-03 RX ADMIN — OLANZAPINE 5 MG: 5 TABLET, ORALLY DISINTEGRATING ORAL at 08:05

## 2022-05-03 RX ADMIN — METOPROLOL TARTRATE 2.5 MG: 5 INJECTION, SOLUTION INTRAVENOUS at 06:05

## 2022-05-03 RX ADMIN — CIPROFLOXACIN 2 DROP: 3 SOLUTION OPHTHALMIC at 10:05

## 2022-05-03 RX ADMIN — CLOPIDOGREL BISULFATE 75 MG: 75 TABLET, FILM COATED ORAL at 08:05

## 2022-05-03 RX ADMIN — MULTIPLE VITAMINS W/ MINERALS TAB 1 TABLET: TAB at 08:05

## 2022-05-03 RX ADMIN — IPRATROPIUM BROMIDE AND ALBUTEROL SULFATE 3 ML: 2.5; .5 SOLUTION RESPIRATORY (INHALATION) at 02:05

## 2022-05-03 RX ADMIN — CIPROFLOXACIN 2 DROP: 3 SOLUTION OPHTHALMIC at 11:05

## 2022-05-03 RX ADMIN — LINEZOLID 600 MG: 600 INJECTION, SOLUTION INTRAVENOUS at 11:05

## 2022-05-03 RX ADMIN — POTASSIUM CHLORIDE 40 MEQ: 750 TABLET, EXTENDED RELEASE ORAL at 09:05

## 2022-05-03 RX ADMIN — Medication 1000 UNITS: at 09:05

## 2022-05-03 RX ADMIN — LEVOTHYROXINE SODIUM 150 MCG: 150 TABLET ORAL at 06:05

## 2022-05-03 RX ADMIN — ENOXAPARIN SODIUM 40 MG: 100 INJECTION SUBCUTANEOUS at 09:05

## 2022-05-03 RX ADMIN — TAMSULOSIN HYDROCHLORIDE 0.4 MG: 0.4 CAPSULE ORAL at 08:05

## 2022-05-03 RX ADMIN — CLINDAMYCIN PHOSPHATE 600 MG: 600 INJECTION, SOLUTION INTRAVENOUS at 03:05

## 2022-05-03 RX ADMIN — PANTOPRAZOLE SODIUM 40 MG: 40 INJECTION, POWDER, FOR SOLUTION INTRAVENOUS at 09:05

## 2022-05-03 RX ADMIN — SERTRALINE HYDROCHLORIDE 100 MG: 50 TABLET ORAL at 08:05

## 2022-05-03 RX ADMIN — CLINDAMYCIN PHOSPHATE 600 MG: 600 INJECTION, SOLUTION INTRAVENOUS at 12:05

## 2022-05-03 RX ADMIN — DOCUSATE SODIUM 100 MG: 100 CAPSULE, LIQUID FILLED ORAL at 08:05

## 2022-05-03 RX ADMIN — BACLOFEN 10 MG: 10 TABLET ORAL at 03:05

## 2022-05-03 RX ADMIN — BACLOFEN 10 MG: 10 TABLET ORAL at 11:05

## 2022-05-03 RX ADMIN — CIPROFLOXACIN 2 DROP: 3 SOLUTION OPHTHALMIC at 02:05

## 2022-05-03 RX ADMIN — IPRATROPIUM BROMIDE AND ALBUTEROL SULFATE 3 ML: 2.5; .5 SOLUTION RESPIRATORY (INHALATION) at 01:05

## 2022-05-03 RX ADMIN — CIPROFLOXACIN 2 DROP: 3 SOLUTION OPHTHALMIC at 06:05

## 2022-05-03 RX ADMIN — CYANOCOBALAMIN TAB 500 MCG 500 MCG: 500 TAB at 08:05

## 2022-05-03 RX ADMIN — HYDROCODONE BITARTRATE AND ACETAMINOPHEN 1 TABLET: 5; 325 TABLET ORAL at 11:05

## 2022-05-03 NOTE — ASSESSMENT & PLAN NOTE
Contributing Nutrition Diagnosis  Malnutrition.    Related to (etiology):   Inadequate protein-energy intake.     Signs and Symptoms (as evidenced by):   Severe muscle and fat wasting.        Nutrition Diagnosis Status:   Improving

## 2022-05-03 NOTE — ASSESSMENT & PLAN NOTE
Nursing notes reviewed and accepted.    Sobia Alcaraz is a 6 month old female who presents for 6 month well child exam.  Patient presents with Mom.    Concerns raised today include:  Mom said that she Sobia is doing very well she is crawling everywhere and pulling up to stand.  She is making a lot of different sounds and she loves to play with cups and pull up on things all day long.  She is taking Alimentum liquid and that works very well for her and she is also doing some oatmeal and some baby food vegetables and fruits.  She only sleeps 3 hours at a time and she wakes up and she has to have a bottle and then she goes right back to sleep.    Diet/feeding:  Alimentum liquid every 3-4 hours and baby food cereal, vegetables and fruits.  Elimination Patterns:  1 bowel movement a day and 6-8 wet diapers a day  Sleep:  3 hours at a time at night and 2 good naps a day  Social/Childcare:  She lives at home with mom and dad and grandparents baby-sit.    INTERVAL HISTORY:  Illnesses: No    Accidents: No :   Vision:   Normal  Hearing:  Normal      GROWTH AND DEVELOPMENT:  [x] Yes [] No - No head lag when pulled to sitting position.  [x] Yes [] No - Grasps rattle/reaches for objects.  [x] Yes [] No - Sits briefly alone.   [x] Yes [] No - Rolls both ways.  [x] Yes [] No - Gums objects.  [x] Yes [] No - Ferry in response to sound.    Developmental concerns:  No concerns    Birth history, medical history, surgical history, and family history reviewed and updated.    PHYSICAL EXAM:  Height 27\" (68.6 cm), weight 7.711 kg, head circumference 43.4 cm (17.09\").  GENERAL:  Well appearing female infant, nontoxic, no acute distress.  Alert and     interactive.  She is crawling all over the exam table and pulling up on mom.  SKIN: Warm, normal turgor.  No cyanosis.  No bruises or lesions.  HEAD:  Normocephalic, atraumatic.  Anterior fontanel open, soft and flat.  EYES:  Conjunctivae appear normal, non-injected, non-icteric.  NOSE:   Treat underlying infection     Will continue to monitor   Appears normal, no flaring.  EARS:  Normal pinnae, no preauricular skin tags or pit.  Tympanic membranes are transparent with good landmarks.  THROAT:  Oropharynx with moist mucous membranes and no lesions.  NECK:  Supple, no lymphadenopathy or masses.  HEART:  Regular rate and rhythm.  Quiet precordium.  Normal S1, S2.  No murmurs, rubs, gallops. Equal femoral pulses.  LUNGS:  Clear to auscultation bilaterally.  No wheezes, rales, rhonchi.  Normal work of breathing.  ABDOMEN:  Soft, nontender.  No organomegaly or masses.  GENITOURINARY: Manpreet 1 female  MUSCULOSKELETAL:  Hips within normal range of motion.  Negative Clark, Ortolani.  Spine straight.  Normal sacrum.  EXTREMITIES:  Warm, dry, without abnormalities.  NEUROLOGIC:  Normal tone, bulk, strength.    ASSESSMENT:  6 month old female well infant.    PLAN:  Mom will continue to feed her a good variety of baby foods along with the Alimentum liquid.  All parental concerns and questions discussed.  Anticipatory guidance provided, handout given.              Development              Starting solids              Accident prevention: Scalding, falls, small toys, smothering              Phase out bottle              Analgesics/antipyretics              Sun exposure              Tobacco-free home              Dental care              Lead exposure risk: None              Vitamin D supplementation if breast feeding              Fluoride supplementation discussed    Immunizations per orders.  Risks, benefits, and side effects discussed.  Mom declined the influenza vaccine but she will get her Prevnar,pediarix and rotateq vaccines today.  Return to clinic for 9 month well child examination or sooner as needed for illness/concerns.

## 2022-05-03 NOTE — PROGRESS NOTES
[facility] - [facility]  [facility] Medicine  Progress Note    Patient Name: Tejas Navas  MRN: [id number]  Patient Class: IP- Inpatient   Admission Date: 4/25/2022  Length of Stay: 8 days  Attending Physician: Navin Lopez III, MD  Primary Care Provider: Navin Lopez III, MD        Subjective:     Principal Problem:Metabolic encephalopathy        HPI:  He  is   better  today   More alert.   He  has  not  gotten  many  opiates in last  24  hours and  narcan has helped   Him be more alert   Not  as many secretions      Overview/Hospital Course:  After patient was readmitted to the hospital floor, he was on the floor last night with improving signs overnight of alertness.  This morning he was able to answer a few yes and no questions.  He recognized nauseated brother but main.  He has been getting his D5 free water intravenously and his feeds.  Patient have a midline which failed an independent in epic today.        Interval History: . .    Review of Systems   Constitutional:  Positive for activity change. Negative for appetite change, chills, diaphoresis, fatigue, fever and unexpected weight change.   HENT:  Negative for congestion, ear pain, facial swelling, nosebleeds, postnasal drip, rhinorrhea, sore throat and tinnitus.    Eyes:  Negative for pain, redness and visual disturbance.   Respiratory:  Negative for apnea, cough, chest tightness, shortness of breath and wheezing.    Cardiovascular:  Negative for chest pain and palpitations.   Gastrointestinal:  Negative for abdominal distention, abdominal pain, anal bleeding, blood in stool, diarrhea, nausea, rectal pain and vomiting.   Endocrine: Negative for cold intolerance and heat intolerance.   Genitourinary:  Negative for difficulty urinating, dysuria, hematuria, penile discharge, penile pain, penile swelling, scrotal swelling and testicular pain.   Musculoskeletal:  Negative for arthralgias, back pain, myalgias and neck pain.   Skin:   Negative for rash.   Neurological:  Negative for dizziness, seizures and light-headedness.   Hematological:  Negative for adenopathy.   Psychiatric/Behavioral:  Negative for agitation, behavioral problems and confusion.    Objective:     Vital Signs (Most Recent):  Temp: 98.7 °F (37.1 °C) (05/02/22 1536)  Pulse: 75 (05/02/22 1536)  Resp: 18 (05/02/22 1536)  BP: 96/60 (05/02/22 1536)  SpO2: 100 % (05/02/22 1536) Vital Signs (24h Range):  Temp:  [97.5 °F (36.4 °C)-100.2 °F (37.9 °C)] 98.7 °F (37.1 °C)  Pulse:  [72-93] 75  Resp:  [18-25] 18  SpO2:  [93 %-100 %] 100 %  BP: ()/(58-85) 96/60     Weight: 54.2 kg (119 lb 7.8 oz)  Body mass index is 17.3 kg/m².    Intake/Output Summary (Last 24 hours) at 5/2/2022 1716  Last data filed at 5/2/2022 0400  Gross per 24 hour   Intake 270 ml   Output 700 ml   Net -430 ml      Physical Exam  Constitutional:       Appearance: Normal appearance. He is normal weight.   HENT:      Head: Normocephalic and atraumatic.      Right Ear: Tympanic membrane, ear canal and external ear normal.      Left Ear: Tympanic membrane, ear canal and external ear normal.      Nose: No congestion.      Mouth/Throat:      Mouth: Mucous membranes are moist.      Pharynx: Oropharynx is clear. No oropharyngeal exudate.   Eyes:      Extraocular Movements: Extraocular movements intact.      Conjunctiva/sclera: Conjunctivae normal.      Pupils: Pupils are equal, round, and reactive to light.   Cardiovascular:      Rate and Rhythm: Normal rate and regular rhythm.      Pulses: Normal pulses.      Heart sounds: Normal heart sounds. No murmur heard.    No gallop.   Pulmonary:      Effort: Pulmonary effort is normal.   Abdominal:      General: Abdomen is flat. Bowel sounds are normal.      Palpations: Abdomen is soft.   Musculoskeletal:         General: No swelling or tenderness. Normal range of motion.      Cervical back: Normal range of motion. No rigidity or tenderness.   Lymphadenopathy:      Cervical: No  cervical adenopathy.   Skin:     General: Skin is warm and dry.   Neurological:      General: No focal deficit present.      Mental Status: He is alert. Mental status is at baseline.      Sensory: No sensory deficit.      Coordination: Coordination normal.   Psychiatric:         Mood and Affect: Mood normal.         Behavior: Behavior normal.         Thought Content: Thought content normal.         Judgment: Judgment normal.       Significant Labs: All pertinent labs within the past 24 hours have been reviewed.    Significant Imaging: I have reviewed all pertinent imaging results/findings within the past 24 hours.  I have reviewed and interpreted all pertinent imaging results/findings within the past 24 hours.      Assessment/Plan:      * Metabolic encephalopathy  Have  Held off on dilaudid as much as possible  Due  To resp  suppresion   Pt is  dnr         Will continue to follow along with the patient and try to balance out comfort care measures with his requirements for palliation of symptoms also says to not cause any adverse issues      Patient seen show some mild improvement in his metabolic encephalopathy in terms of his sodium and perhaps infection.  However we still have issue with his hypoxemia which is playing a factor with this as well    Hypokalemia    Replace   k     w  Iv and   Through  g tube    Acute on chronic systolic heart failure    Iv  Beta blocker and  Monitor  His   Fluid status     Unclear etiology overall for his heart failure.  Patient is not a candidate for intervention because of his other underlying issues such as his dementia weight loss etc...  Please see my notes from [employer] documentation    Septicemic  On levaquin and zosyn .  Continue these antibiotics      Increased body temperature  Treat underlying infection     Will continue to monitor    Acute abdominal pain    Will cont  Tx  Constipation     Patient with initial constipation on exam and per CT.  This has since  resolved.        VTE Risk Mitigation (From admission, onward)           Ordered     enoxaparin injection 40 mg  Daily         04/30/22 2058                    Discharge Planning   ISSA:      Code Status: DNR   Is the patient medically ready for discharge?: No    Reason for patient still in hospital (select all that apply): Treatment                     Navin Lopez III, MD  Department of Hospital Medicine   [facility]     Progress Note    Admit Date: 4/25/2022   LOS: 8 days     SUBJECTIVE:     Follow-up For:  Patient with minimal improvement on the floor overnight with D5W.  He is announcing enhancing questions today.  These are very simple applies.  He still looks frail and short of breath    Scheduled Meds:   albuterol-ipratropium  3 mL Nebulization Q6H    aspirin  81 mg Per G Tube Daily    baclofen  10 mg Per G Tube TID    ciprofloxacin HCl  2 drop Both Eyes Q4H    clindamycin (CLEOCIN) IVPB  600 mg Intravenous Q8H    clopidogreL  75 mg Per G Tube Daily    cyanocobalamin  500 mcg Oral Daily    docusate sodium  100 mg Oral Daily    enoxaparin  40 mg Subcutaneous Daily    furosemide (LASIX) injection  20 mg Intravenous Daily    HYDROcodone-acetaminophen  1 tablet Per G Tube Q6H WAKE    levothyroxine  150 mcg Per G Tube Before breakfast    linezolid  600 mg Intravenous BID    multivitamin  1 tablet Oral Daily    olanzapine zydis  5 mg Oral BID    pantoprazole  40 mg Intravenous Daily    piperacillin-tazobactam (ZOSYN) IVPB  4.5 g Intravenous Q8H    potassium chloride  40 mEq Oral BID    sertraline  100 mg Per G Tube Daily    sodium chloride 0.9%  10 mL Intravenous Q6H    tamsulosin  0.4 mg Oral Daily    vitamin D  1,000 Units Per G Tube Daily     Continuous Infusions:   dextrose 5 % 45 mL/hr at 05/02/22 0000     PRN Meds:bisacodyL, HYDROmorphone, Flushing PICC Protocol **AND** sodium chloride 0.9% **AND** sodium chloride 0.9%    Review of patient's allergies indicates:  "  Allergen Reactions    Codeine Hives     Other reaction(s): hives    Morphine Other (See Comments)     Other reaction(s): unknown  "went berserk"      Opioids - morphine analogues        Review of Systems  ROS Fourteen point review of systems are negative except as otherwise mentioned in the subjective/HPI    OBJECTIVE:     Vital Signs (Most Recent)  Temp: 98.4 °F (36.9 °C) (05/03/22 1200)  Pulse: 102 (05/03/22 1330)  Resp: 16 (05/03/22 1445)  BP: 102/66 (05/03/22 1200)  SpO2: 100 % (05/03/22 1331)    Vital Signs Range (Last 24H):  Temp:  [96.8 °F (36 °C)-98.4 °F (36.9 °C)]   Pulse:  []   Resp:  [16-24]   BP: ()/(46-90)   SpO2:  [94 %-100 %]     I & O (Last 24H):    Intake/Output Summary (Last 24 hours) at 5/3/2022 1841  Last data filed at 5/3/2022 1000  Gross per 24 hour   Intake 15 ml   Output --   Net 15 ml     Physical Exam:  Physical Exam patient alert oriented x1.  The nurse Na is at the bedside.  Cranial nerves cannot be assessed.  He appears frail.  With mild respiratory distress.  Mucous membranes are dry.  Nasal OxyMask is present.  6 L. no JVD.  Regular rate and rhythm no rubs gallops or murmurs heard decreased breath sounds bilaterally with crackles.  Abdomen is scaphoid with PEG tube in place clean dry intact.  Bilateral lower extremities are contractures in very painful to movement along the hips and knees.  Heels are floated bilaterally.    Laboratory:  No results found for this or any previous visit (from the past 24 hour(s)).     Diagnostic Results:  No results found.     ASSESSMENT/PLAN:     Hypokalemia    Replace   k     w  Iv and   Through  g tube    Septicemic  On levaquin and zosyn       Increased body temperature  Treat underlying infection       Acute abdominal pain    Will cont  Tx  Constipation       Metabolic encephalopathy  Have  Held off on dilaudid as much as possible  Due  To resp  suppresion   Pt is  dnr       Acute on chronic systolic heart failure    Iv  Beta blocker " and  Monitor  His   Fluid status     Metabolic encephalopathy  Have  Held off on dilaudid as much as possible  Due  To resp  suppresion   Pt is  dnr         Will continue to follow along with the patient and try to balance out comfort care measures with his requirements for palliation of symptoms also says to not cause any adverse issues      Patient seen show some mild improvement in his metabolic encephalopathy in terms of his sodium and perhaps infection.  However we still have issue with his hypoxemia which is playing a factor with this as well    Acute on chronic systolic heart failure    Iv  Beta blocker and  Monitor  His   Fluid status     Unclear etiology overall for his heart failure.  Patient is not a candidate for intervention because of his other underlying issues such as his dementia weight loss etc...  Please see my notes from [employer] documentation    Acute abdominal pain    Will cont  Tx  Constipation     Patient with initial constipation on exam and per CT.  This has since resolved.      Increased body temperature  Treat underlying infection     Will continue to monitor    Acute abdominal pain  Contributing Nutrition Diagnosis  Malnutrition.    Related to (etiology):   Inadequate protein-energy intake.     Signs and Symptoms (as evidenced by):   Severe muscle and fat wasting.        Nutrition Diagnosis Status:   Improving      Septicemic  On levaquin and zosyn .  Continue these antibiotics

## 2022-05-03 NOTE — ASSESSMENT & PLAN NOTE
Iv  Beta blocker and  Monitor  His   Fluid status     Unclear etiology overall for his heart failure.  Patient is not a candidate for intervention because of his other underlying issues such as his dementia weight loss etc...  Please see my notes from [employer] documentation

## 2022-05-03 NOTE — CONSULTS
[facility]  Adult Nutrition  Consult Note    SUMMARY     Recommendations    Recommendation/Intervention: Continue Isosource 1.5 @ 25mL/hr and increase as tolerated to goal rate of 55mL/hr to provide 1898 kcals/86g protein.  Consider adding 30mL/day of ProSource to provide an additional 15g of protein.  Monitor TF tolerance, rate, weight, and labs.  Goals: Tolerate TF at goal rate.  Nutrition Goal Status: progressing towards goal  Communication of RD Recs: reviewed with RN    Assessment and Plan    Acute abdominal pain  Contributing Nutrition Diagnosis  Malnutrition.    Related to (etiology):   Inadequate protein-energy intake.     Signs and Symptoms (as evidenced by):   Severe muscle and fat wasting.        Nutrition Diagnosis Status:   Improving           Malnutrition Assessment  Malnutrition Type: chronic illness (Severe Malnutrition.)          Energy Intake (Malnutrition): less than or equal to 50% for greater than or equal to 5 days  Subcutaneous Fat (Malnutrition): severe depletion  Muscle Mass (Malnutrition): severe depletion   Orbital Region (Subcutaneous Fat Loss): severe depletion  Upper Arm Region (Subcutaneous Fat Loss): severe depletion   [place] Region (Muscle Loss): severe depletion  Clavicle Bone Region (Muscle Loss): severe depletion  Clavicle and Acromion Bone Region (Muscle Loss): severe depletion  Scapular Bone Region (Muscle Loss): severe depletion  Dorsal Hand (Muscle Loss): severe depletion  Patellar Region (Muscle Loss): severe depletion  Anterior Thigh Region (Muscle Loss): severe depletion                 Reason for Assessment    Reason For Assessment: consult, new tube feeding, other (see comments) (Resume TF.)  Diagnosis:  ( Acute abdominal pain 05/01/2022   Increased body temperature 05/01/2022   Metabolic encephalopathy 05/01/2022   Septicemic 05/01/2022   Acute on chronic systolic heart failure 05/01/2022   Hypokalemia)  Relevant Medical  "History: Abdominal pain     Abnormal radionuclide scan     CHF (congestive heart failure)     CVA (cerebrovascular accident)     Delirium     Dysphagia     Hypercholesterolemia     Hypernatremia     Hypothyroidism     Hypoxemia     Impaired mobility     Inanition     Iron deficiency anemia     Left bundle branch block     Malnutrition related to chronic disease     Metabolic encephalopathy     Muscle weakness     Nutritional marasmus     Paroxysmal atrial fibrillation     Sudden visual loss    Nutrition Risk Screen    Nutrition Risk Screen: tube feeding or parenteral nutrition    Nutrition/Diet History    Patient Reported Diet/Restrictions/Preferences: no oral intake  Factors Affecting Nutritional Intake: altered gastrointestinal function, chewing difficulties/inability to chew food  Nutrition Support Formula Prior to Admit: Isosource 1.5  Nutrition Support Rate Prior to Admit: 55 (ml)  Nutrtion Support Frequency Prior to Admit: Continuous    Anthropometrics    Temp: 98.4 °F (36.9 °C)  Height Method: Estimated  Height: 5' 9.69" (177 cm)  Height (inches): 69.69 in  Weight Method: Estimated  Weight: 54.2 kg (119 lb 7.8 oz)  Weight (lb): 119.49 lb  Ideal Body Weight (IBW), Male: 164.14 lb  % Ideal Body Weight, Male (lb): 72.8 %  BMI (Calculated): 17.3  BMI Grade: 17 - 18.4 protein-energy malnutrition grade I  Usual Body Weight (UBW), k kg  % Usual Body Weight: 98.75  % Weight Change From Usual Weight: -1.46 %       Lab/Procedures/Meds    Pertinent Labs Reviewed: reviewed  Pertinent Labs Comments: K+ 3.3(L); WBC 13.7(H)  Pertinent Medications Reviewed: reviewed  Pertinent Medications Comments: MVI; KCL; B12; D    Physical Findings/Assessment         Estimated/Assessed Needs    Weight Used For Calorie Calculations: 54 kg (119 lb 0.8 oz)  Energy Calorie Requirements (kcal): 1620 to 1890  Energy Need Method: Kcal/kg  Protein Requirements: 90 to 111 gm  Weight Used For Protein Calculations: 74 kg (163 lb 2.3 oz)   "   Estimated Fluid Requirement Method: RDA Method  RDA Method (mL): 1620         Nutrition Prescription Ordered    Current Diet Order: NPO  Current Nutrition Support Formula Ordered: Isosource 1.5  Current Nutrition Support Rate Ordered: 25 (ml)  Current Nutrition Support Frequency Ordered: continuous    Evaluation of Received Nutrient/Fluid Intake    Enteral Calories (kcal): 863  Enteral Protein (gm): 40  Enteral (Free Water) Fluid (mL): 439  Free Water Flush Fluid (mL): 15  % Kcal Needs: 53  % Protein Needs: 44  Total Fluid Intake (mL): 800  Energy Calories Required: not meeting needs  Protein Required: not meeting needs  Fluid Required: not meeting needs  Total Fluid Intake (mL/kg): 15  Tolerance: tolerating  % Intake of Estimated Energy Needs: 25 - 50 %  % Meal Intake: NPO    Nutrition Risk    Level of Risk/Frequency of Follow-up: high       Monitor and Evaluation    Food and Nutrient Intake: enteral nutrition intake  Food and Nutrient Adminstration: enteral and parenteral nutrition administration  Anthropometric Measurements: weight  Biochemical Data, Medical Tests and Procedures: electrolyte and renal panel, gastrointestinal profile, glucose/endocrine profile, inflammatory profile, lipid profile       Nutrition Follow-Up    RD Follow-up?: Yes

## 2022-05-03 NOTE — PROGRESS NOTES
Progress Note    Admit Date: 4/25/2022   LOS: 7 days     SUBJECTIVE:     Follow-up For:  Patient over the weekend showed continued and significant decline.  Dr. Ramsey was kind enough over the weekend to evaluate him from the standpoint of the ICU, she felt that he was having significant issues in terms of respiratory effort.  He continues to shows signs of difficulty in maintaining his O2 sat.  She had a lengthy discussion with the patient and his family and they noted they would prefer for him to be DNR after they spoke to both power-of-, mac Mrs. Vicky brown.     The patient is not eating.  He is getting his medications we have stopped his tube feedings as well because of concern of asthma  Scheduled Meds   albuterol-ipratropium  3 mL Nebulization Q6H    aspirin  81 mg Per G Tube Daily    baclofen  10 mg Per G Tube TID    ciprofloxacin HCl  2 drop Both Eyes Q4H    clindamycin (CLEOCIN) IVPB  600 mg Intravenous Q8H    clopidogreL  75 mg Per G Tube Daily    cyanocobalamin  500 mcg Oral Daily    docusate sodium  100 mg Oral Daily    enoxaparin  40 mg Subcutaneous Daily    furosemide (LASIX) injection  20 mg Intravenous Daily    HYDROcodone-acetaminophen  1 tablet Per G Tube Q6H WAKE    levothyroxine  150 mcg Per G Tube Before breakfast    linezolid  600 mg Intravenous BID    metoprolol  2.5 mg Intravenous Q6H    multivitamin  1 tablet Oral Daily    olanzapine zydis  5 mg Oral BID    pantoprazole  40 mg Intravenous Daily    piperacillin-tazobactam (ZOSYN) IVPB  4.5 g Intravenous Q8H    potassium chloride  40 mEq Oral BID    sertraline  100 mg Per G Tube Daily    tamsulosin  0.4 mg Oral Daily    vitamin D  1,000 Units Per G Tube Daily     Continuous Infusions:   dextrose 5 % 45 mL/hr at 05/02/22 0000     PRN Meds:bisacodyL, HYDROmorphone    Review of patient's allergies indicates:   Allergen Reactions    Codeine Hives     Other reaction(s): hives    Morphine Other (See Comments)      "Other reaction(s): unknown  "went berserk"      Opioids - morphine analogues        Review of Systems  ROS   Fourteen point review of systems are negative except as otherwise mentioned in the subjective/HPI    OBJECTIVE:     Vital Signs (Most Recent)  Temp: 97 °F (36.1 °C) (05/02/22 2100)  Pulse: 75 (05/02/22 2100)  Resp: 20 (05/02/22 2100)  BP: 96/60 (05/02/22 1536)  SpO2: 98 % (05/02/22 2100)    Vital Signs Range (Last 24H):  Temp:  [97 °F (36.1 °C)-100.2 °F (37.9 °C)]   Pulse:  [72-88]   Resp:  [18-24]   BP: ()/(58-73)   SpO2:  [93 %-100 %]     I & O (Last 24H):    Intake/Output Summary (Last 24 hours) at 5/2/2022 2320  Last data filed at 5/2/2022 1813  Gross per 24 hour   Intake 310 ml   Output 700 ml   Net -390 ml     Physical Exam:  Physical Exam   Patient is alert and oriented x3.  Cranial nerves II-XII are intact.  No JVD.  No respiratory distress.  Regular rate and rhythm, no rubs gallops or murmurs.  Lungs are clear to auscultation bilaterally.  Abdomen is soft, nontender, nondistended, no rebound, no guarding.  There is no clubbing, cyanosis, or edema to bilateral lower extremities.    Laboratory:  Recent Results (from the past 24 hour(s))   Comprehensive Metabolic Panel    Collection Time: 05/02/22  5:18 AM   Result Value Ref Range    Sodium Level 140 136 - 145 mmol/L    Potassium Level 3.3 (L) 3.5 - 5.1 mmol/L    Chloride 105 98 - 107 mmol/L    Carbon Dioxide 22 (L) 23 - 31 mmol/L    Glucose Level 110 82 - 115 mg/dL    Blood Urea Nitrogen 17.0 8.4 - 25.7 mg/dL    Creatinine 1.02 0.73 - 1.18 mg/dL    Calcium Level Total 8.9 8.8 - 10.0 mg/dL    Protein Total 7.4 5.8 - 7.6 gm/dL    Albumin Level 2.0 (L) 3.4 - 4.8 gm/dL    Globulin 5.4 (H) 2.4 - 3.5 gm/dL    Albumin/Globulin Ratio 0.4 (L) 1.1 - 2.0 ratio    Bilirubin Total 0.9 <=1.5 mg/dL    Bilirubin Direct 0.3 0.0 - 0.5 mg/dL    Bilirubin Indirect 0.60 0.00 - 0.80 mg/dL    Alkaline Phosphatase 170 (H) 40 - 150 unit/L    Alanine Aminotransferase 84 " (H) 0 - 55 unit/L    Aspartate Aminotransferase 83 (H) 5 - 34 unit/L    Estimated GFR-Non  >60 mls/min/1.73/m2   Magnesium    Collection Time: 05/02/22  5:18 AM   Result Value Ref Range    Magnesium Level 1.90 1.60 - 2.60 mg/dL   CBC with Differential    Collection Time: 05/02/22  5:18 AM   Result Value Ref Range    WBC 13.7 (H) 4.5 - 11.5 x10(3)/mcL    RBC 4.06 (L) 4.70 - 6.10 x10(6)/mcL    Hgb 12.2 (L) 14.0 - 18.0 gm/dL    Hct 38.0 (L) 42.0 - 52.0 %    MCV 93.6 80.0 - 94.0 fL    MCH 30.0 27.0 - 31.0 pg    MCHC 32.1 (L) 33.0 - 36.0 mg/dL    RDW 14.5 11.5 - 17.0 %    Platelet 246 130 - 400 x10(3)/mcL    MPV 11.9 9.4 - 12.4 fL    Neutro Auto 89.8 %    Lymph Auto 4.9 %    Mono Auto 2.9 %    Eos Auto 1.5 %    Basophil Auto 0.4 %    Abs Lymph 0.67 0.6 - 4.6 x10(3)/mcL    Abs Neutro 12.3 (H) 2.1 - 9.2 x10(3)/mcL    Abs Mono 0.40 0.1 - 1.3 x10(3)/mcL    Abs Eos 0.20 0 - 0.9 x10(3)/mcL    Abs Baso 0.06 0 - 0.2 x10(3)/mcL    IG# 0.07 (H) 0 - 0.0155 x10(3)/mcL    IG% 0.5 (H) 0 - 0.43 %   Manual Differential    Collection Time: 05/02/22  5:18 AM   Result Value Ref Range    Neut Man 93 (H) 47 - 80 %    Lymph Man 3 (L) 13 - 40 %    Monocyte Man 4 2 - 11 %    Abs Lymp Man 411 (L) 3,400 - 13,700 /mcL    Abs Mono Man 548 100 - 1,300 /mcL    Platelet Est Adequate Adequate    RBC Morph Abnormal (A) Normal    Hypochrom Slight (A) (none)        Diagnostic Results:  No results found.     ASSESSMENT/PLAN:       Hypokalemia    Replace   k     w  Iv and   Through  g tube    Septicemic  On levaquin and zosyn       Increased body temperature  Treat underlying infection       Acute abdominal pain    Will cont  Tx  Constipation       Metabolic encephalopathy  Have  Held off on dilaudid as much as possible  Due  To resp  suppresion   Pt is  dnr       Acute on chronic systolic heart failure    Iv  Beta blocker and  Monitor  His   Fluid status     Metabolic encephalopathy  Have  Held off on dilaudid as much as possible  Due  To  resp  suppresion   Pt is  dnr         Will continue to follow along with the patient and try to balance out comfort care measures with his requirements for palliation of symptoms also says to not cause any adverse issues      Acute on chronic systolic heart failure    Iv  Beta blocker and  Monitor  His   Fluid status     Unclear etiology overall for his heart failure.  Patient is not a candidate for intervention because of his other underlying issues such as his dementia weight loss etc...  Please see my notes from [employer] documentation    Acute abdominal pain    Will cont  Tx  Constipation     Patient with initial constipation on exam and per CT.  This has since resolved.      Increased body temperature  Treat underlying infection     Will continue to monitor      Plan:   Patient Active Problem List    Diagnosis Date Noted    Acute abdominal pain 05/01/2022    Increased body temperature 05/01/2022    Metabolic encephalopathy 05/01/2022    Septicemic 05/01/2022    Acute on chronic systolic heart failure 05/01/2022    Hypokalemia 05/01/2022

## 2022-05-03 NOTE — ASSESSMENT & PLAN NOTE
Will cont  Tx  Constipation     Patient with initial constipation on exam and per CT.  This has since resolved.

## 2022-05-03 NOTE — HOSPITAL COURSE
After patient was readmitted to the hospital floor, he was on the floor last night with improving signs overnight of alertness.  This morning he was able to answer a few yes and no questions.  He recognized nauseated brother but main.  He has been getting his D5 free water intravenously and his feeds.  Patient have a midline which failed an independent in epic today.      Pt with MINIMAL RECOVERY AND PERSISTENT AMS SINCE ICU STEPDOWN. ON 2 L NC LESS. NO EATING. RARELY VERBAL WITH STAFF

## 2022-05-03 NOTE — ASSESSMENT & PLAN NOTE
Have  Held off on dilaudid as much as possible  Due  To resp  suppresion   Pt is  dnr         Will continue to follow along with the patient and try to balance out comfort care measures with his requirements for palliation of symptoms also says to not cause any adverse issues      Patient seen show some mild improvement in his metabolic encephalopathy in terms of his sodium and perhaps infection.  However we still have issue with his hypoxemia which is playing a factor with this as well

## 2022-05-04 LAB
ALBUMIN SERPL-MCNC: 1.9 GM/DL (ref 3.4–4.8)
ALBUMIN/GLOB SERPL: 0.4 RATIO (ref 1.1–2)
ALP SERPL-CCNC: 132 UNIT/L (ref 40–150)
ALT SERPL-CCNC: 86 UNIT/L (ref 0–55)
AST SERPL-CCNC: 66 UNIT/L (ref 5–34)
BASOPHILS # BLD AUTO: 0.05 X10(3)/MCL (ref 0–0.2)
BASOPHILS NFR BLD AUTO: 0.4 %
BILIRUBIN DIRECT+TOT PNL SERPL-MCNC: 0.2 MG/DL (ref 0–0.5)
BILIRUBIN DIRECT+TOT PNL SERPL-MCNC: 0.2 MG/DL (ref 0–0.8)
BILIRUBIN DIRECT+TOT PNL SERPL-MCNC: 0.4 MG/DL
BUN SERPL-MCNC: 15 MG/DL (ref 8.4–25.7)
CALCIUM SERPL-MCNC: 8.1 MG/DL (ref 8.8–10)
CHLORIDE SERPL-SCNC: 107 MMOL/L (ref 98–107)
CO2 SERPL-SCNC: 29 MMOL/L (ref 23–31)
CREAT SERPL-MCNC: 0.84 MG/DL (ref 0.73–1.18)
EOSINOPHIL # BLD AUTO: 0.83 X10(3)/MCL (ref 0–0.9)
EOSINOPHIL NFR BLD AUTO: 6.6 %
ERYTHROCYTE [DISTWIDTH] IN BLOOD BY AUTOMATED COUNT: 14.6 % (ref 11.5–17)
GLOBULIN SER-MCNC: 4.4 GM/DL (ref 2.4–3.5)
GLUCOSE SERPL-MCNC: 100 MG/DL (ref 82–115)
HCT VFR BLD AUTO: 34.6 % (ref 42–52)
HGB BLD-MCNC: 10.7 GM/DL (ref 14–18)
IMM GRANULOCYTES # BLD AUTO: 0.02 X10(3)/MCL (ref 0–0.02)
IMM GRANULOCYTES NFR BLD AUTO: 0.2 % (ref 0–0.43)
LYMPHOCYTES # BLD AUTO: 0.85 X10(3)/MCL (ref 0.6–4.6)
LYMPHOCYTES NFR BLD AUTO: 6.8 %
MAGNESIUM SERPL-MCNC: 2.1 MG/DL (ref 1.6–2.6)
MCH RBC QN AUTO: 29.8 PG (ref 27–31)
MCHC RBC AUTO-ENTMCNC: 30.9 MG/DL (ref 33–36)
MCV RBC AUTO: 96.4 FL (ref 80–94)
MONOCYTES # BLD AUTO: 0.2 X10(3)/MCL (ref 0.1–1.3)
MONOCYTES NFR BLD AUTO: 1.6 %
NEUTROPHILS # BLD AUTO: 10.6 X10(3)/MCL (ref 2.1–9.2)
NEUTROPHILS NFR BLD AUTO: 84.4 %
PHOSPHATE SERPL-MCNC: 3 MG/DL (ref 2.3–4.7)
PLATELET # BLD AUTO: 353 X10(3)/MCL (ref 130–400)
PMV BLD AUTO: 10.5 FL (ref 9.4–12.4)
POTASSIUM SERPL-SCNC: 4.7 MMOL/L (ref 3.5–5.1)
PROT SERPL-MCNC: 6.3 GM/DL (ref 5.8–7.6)
RBC # BLD AUTO: 3.59 X10(6)/MCL (ref 4.7–6.1)
SODIUM SERPL-SCNC: 141 MMOL/L (ref 136–145)
WBC # SPEC AUTO: 12.5 X10(3)/MCL (ref 4.5–11.5)

## 2022-05-04 PROCEDURE — 94640 AIRWAY INHALATION TREATMENT: CPT

## 2022-05-04 PROCEDURE — 84100 ASSAY OF PHOSPHORUS: CPT | Performed by: INTERNAL MEDICINE

## 2022-05-04 PROCEDURE — 83735 ASSAY OF MAGNESIUM: CPT | Performed by: INTERNAL MEDICINE

## 2022-05-04 PROCEDURE — C9113 INJ PANTOPRAZOLE SODIUM, VIA: HCPCS

## 2022-05-04 PROCEDURE — 80053 COMPREHEN METABOLIC PANEL: CPT | Performed by: INTERNAL MEDICINE

## 2022-05-04 PROCEDURE — 85025 COMPLETE CBC W/AUTO DIFF WBC: CPT | Performed by: INTERNAL MEDICINE

## 2022-05-04 PROCEDURE — A4216 STERILE WATER/SALINE, 10 ML: HCPCS | Performed by: INTERNAL MEDICINE

## 2022-05-04 PROCEDURE — 21400001 HC TELEMETRY ROOM

## 2022-05-04 PROCEDURE — 36415 COLL VENOUS BLD VENIPUNCTURE: CPT | Performed by: INTERNAL MEDICINE

## 2022-05-04 PROCEDURE — 25000003 PHARM REV CODE 250: Performed by: INTERNAL MEDICINE

## 2022-05-04 PROCEDURE — C1751 CATH, INF, PER/CENT/MIDLINE: HCPCS

## 2022-05-04 PROCEDURE — 25000003 PHARM REV CODE 250: Performed by: FAMILY MEDICINE

## 2022-05-04 PROCEDURE — 27100171 HC OXYGEN HIGH FLOW UP TO 24 HOURS

## 2022-05-04 PROCEDURE — 11000001 HC ACUTE MED/SURG PRIVATE ROOM

## 2022-05-04 PROCEDURE — 94761 N-INVAS EAR/PLS OXIMETRY MLT: CPT

## 2022-05-04 PROCEDURE — 27000221 HC OXYGEN, UP TO 24 HOURS

## 2022-05-04 PROCEDURE — 25000242 PHARM REV CODE 250 ALT 637 W/ HCPCS

## 2022-05-04 PROCEDURE — 63600175 PHARM REV CODE 636 W HCPCS

## 2022-05-04 PROCEDURE — 25000003 PHARM REV CODE 250

## 2022-05-04 RX ADMIN — PANTOPRAZOLE SODIUM 40 MG: 40 INJECTION, POWDER, FOR SOLUTION INTRAVENOUS at 09:05

## 2022-05-04 RX ADMIN — LINEZOLID 600 MG: 600 INJECTION, SOLUTION INTRAVENOUS at 08:05

## 2022-05-04 RX ADMIN — POTASSIUM CHLORIDE 40 MEQ: 750 TABLET, EXTENDED RELEASE ORAL at 09:05

## 2022-05-04 RX ADMIN — CIPROFLOXACIN 2 DROP: 3 SOLUTION OPHTHALMIC at 09:05

## 2022-05-04 RX ADMIN — PIPERACILLIN SODIUM AND TAZOBACTAM SODIUM 4.5 G: 4; 500 INJECTION, POWDER, FOR SOLUTION INTRAVENOUS at 01:05

## 2022-05-04 RX ADMIN — IPRATROPIUM BROMIDE AND ALBUTEROL SULFATE 3 ML: 2.5; .5 SOLUTION RESPIRATORY (INHALATION) at 07:05

## 2022-05-04 RX ADMIN — BACLOFEN 10 MG: 10 TABLET ORAL at 09:05

## 2022-05-04 RX ADMIN — Medication 1000 UNITS: at 09:05

## 2022-05-04 RX ADMIN — DEXTROSE MONOHYDRATE: 50 INJECTION, SOLUTION INTRAVENOUS at 01:05

## 2022-05-04 RX ADMIN — SODIUM CHLORIDE, PRESERVATIVE FREE 10 ML: 5 INJECTION INTRAVENOUS at 06:05

## 2022-05-04 RX ADMIN — PIPERACILLIN SODIUM AND TAZOBACTAM SODIUM 4.5 G: 4; 500 INJECTION, POWDER, FOR SOLUTION INTRAVENOUS at 09:05

## 2022-05-04 RX ADMIN — HYDROCODONE BITARTRATE AND ACETAMINOPHEN 1 TABLET: 5; 325 TABLET ORAL at 09:05

## 2022-05-04 RX ADMIN — SODIUM CHLORIDE, PRESERVATIVE FREE 10 ML: 5 INJECTION INTRAVENOUS at 12:05

## 2022-05-04 RX ADMIN — ENOXAPARIN SODIUM 40 MG: 100 INJECTION SUBCUTANEOUS at 09:05

## 2022-05-04 RX ADMIN — CLOPIDOGREL BISULFATE 75 MG: 75 TABLET, FILM COATED ORAL at 09:05

## 2022-05-04 RX ADMIN — IPRATROPIUM BROMIDE AND ALBUTEROL SULFATE 3 ML: 2.5; .5 SOLUTION RESPIRATORY (INHALATION) at 01:05

## 2022-05-04 RX ADMIN — BACLOFEN 10 MG: 10 TABLET ORAL at 02:05

## 2022-05-04 RX ADMIN — DOCUSATE SODIUM 100 MG: 100 CAPSULE, LIQUID FILLED ORAL at 09:05

## 2022-05-04 RX ADMIN — LEVOTHYROXINE SODIUM 150 MCG: 150 TABLET ORAL at 05:05

## 2022-05-04 RX ADMIN — CYANOCOBALAMIN TAB 500 MCG 500 MCG: 500 TAB at 09:05

## 2022-05-04 RX ADMIN — SODIUM CHLORIDE, PRESERVATIVE FREE 10 ML: 5 INJECTION INTRAVENOUS at 05:05

## 2022-05-04 RX ADMIN — SERTRALINE HYDROCHLORIDE 100 MG: 50 TABLET ORAL at 09:05

## 2022-05-04 RX ADMIN — OLANZAPINE 5 MG: 5 TABLET, ORALLY DISINTEGRATING ORAL at 09:05

## 2022-05-04 RX ADMIN — IPRATROPIUM BROMIDE AND ALBUTEROL SULFATE 3 ML: 2.5; .5 SOLUTION RESPIRATORY (INHALATION) at 02:05

## 2022-05-04 RX ADMIN — FUROSEMIDE 20 MG: 10 INJECTION INTRAMUSCULAR; INTRAVENOUS at 09:05

## 2022-05-04 RX ADMIN — HYDROCODONE BITARTRATE AND ACETAMINOPHEN 1 TABLET: 5; 325 TABLET ORAL at 08:05

## 2022-05-04 RX ADMIN — HYDROCODONE BITARTRATE AND ACETAMINOPHEN 1 TABLET: 5; 325 TABLET ORAL at 02:05

## 2022-05-04 RX ADMIN — LINEZOLID 600 MG: 600 INJECTION, SOLUTION INTRAVENOUS at 09:05

## 2022-05-04 RX ADMIN — OLANZAPINE 5 MG: 5 TABLET, ORALLY DISINTEGRATING ORAL at 01:05

## 2022-05-04 RX ADMIN — CLINDAMYCIN PHOSPHATE 600 MG: 600 INJECTION, SOLUTION INTRAVENOUS at 07:05

## 2022-05-04 RX ADMIN — TAMSULOSIN HYDROCHLORIDE 0.4 MG: 0.4 CAPSULE ORAL at 09:05

## 2022-05-04 RX ADMIN — CLINDAMYCIN PHOSPHATE 600 MG: 600 INJECTION, SOLUTION INTRAVENOUS at 10:05

## 2022-05-04 RX ADMIN — PIPERACILLIN SODIUM AND TAZOBACTAM SODIUM 4.5 G: 4; 500 INJECTION, POWDER, FOR SOLUTION INTRAVENOUS at 06:05

## 2022-05-04 RX ADMIN — CLINDAMYCIN PHOSPHATE 600 MG: 600 INJECTION, SOLUTION INTRAVENOUS at 01:05

## 2022-05-04 RX ADMIN — ASPIRIN 81 MG 81 MG: 81 TABLET ORAL at 09:05

## 2022-05-05 PROBLEM — E87.6 HYPOKALEMIA: Status: RESOLVED | Noted: 2022-05-01 | Resolved: 2022-05-05

## 2022-05-05 PROBLEM — R50.9: Status: RESOLVED | Noted: 2022-05-01 | Resolved: 2022-05-05

## 2022-05-05 PROBLEM — R10.9 ACUTE ABDOMINAL PAIN: Status: RESOLVED | Noted: 2022-05-01 | Resolved: 2022-05-05

## 2022-05-05 PROBLEM — F03.918 DEMENTIA WITH BEHAVIORAL DISTURBANCE: Chronic | Status: ACTIVE | Noted: 2022-05-05

## 2022-05-05 PROBLEM — A41.9: Status: RESOLVED | Noted: 2022-05-01 | Resolved: 2022-05-05

## 2022-05-05 PROCEDURE — A4216 STERILE WATER/SALINE, 10 ML: HCPCS | Performed by: INTERNAL MEDICINE

## 2022-05-05 PROCEDURE — 21400001 HC TELEMETRY ROOM

## 2022-05-05 PROCEDURE — 94640 AIRWAY INHALATION TREATMENT: CPT

## 2022-05-05 PROCEDURE — 94761 N-INVAS EAR/PLS OXIMETRY MLT: CPT

## 2022-05-05 PROCEDURE — 63600175 PHARM REV CODE 636 W HCPCS

## 2022-05-05 PROCEDURE — 25000242 PHARM REV CODE 250 ALT 637 W/ HCPCS

## 2022-05-05 PROCEDURE — 25000003 PHARM REV CODE 250: Performed by: INTERNAL MEDICINE

## 2022-05-05 PROCEDURE — 11000001 HC ACUTE MED/SURG PRIVATE ROOM

## 2022-05-05 PROCEDURE — 99900031 HC PATIENT EDUCATION (STAT)

## 2022-05-05 PROCEDURE — C9113 INJ PANTOPRAZOLE SODIUM, VIA: HCPCS

## 2022-05-05 PROCEDURE — 25000003 PHARM REV CODE 250: Performed by: FAMILY MEDICINE

## 2022-05-05 PROCEDURE — 27100171 HC OXYGEN HIGH FLOW UP TO 24 HOURS

## 2022-05-05 PROCEDURE — 27000221 HC OXYGEN, UP TO 24 HOURS

## 2022-05-05 PROCEDURE — 25000003 PHARM REV CODE 250

## 2022-05-05 RX ORDER — OLANZAPINE 5 MG/1
5 TABLET, ORALLY DISINTEGRATING ORAL 2 TIMES DAILY
Qty: 60 TABLET | Refills: 11 | Status: SHIPPED | OUTPATIENT
Start: 2022-05-06 | End: 2023-05-06

## 2022-05-05 RX ORDER — HYDROCODONE BITARTRATE AND ACETAMINOPHEN 5; 325 MG/1; MG/1
1 TABLET ORAL
Qty: 21 TABLET | Refills: 0
Start: 2022-05-06 | End: 2022-05-13

## 2022-05-05 RX ORDER — IPRATROPIUM BROMIDE AND ALBUTEROL SULFATE 2.5; .5 MG/3ML; MG/3ML
3 SOLUTION RESPIRATORY (INHALATION) EVERY 6 HOURS
Qty: 75 ML | Refills: 0 | Status: SHIPPED | OUTPATIENT
Start: 2022-05-06 | End: 2023-05-06

## 2022-05-05 RX ORDER — BACLOFEN 10 MG/1
10 TABLET ORAL 3 TIMES DAILY
Qty: 30 TABLET | Refills: 0 | Status: SHIPPED | OUTPATIENT
Start: 2022-05-05

## 2022-05-05 RX ORDER — CIPROFLOXACIN HYDROCHLORIDE 3 MG/ML
2 SOLUTION/ DROPS OPHTHALMIC EVERY 4 HOURS
Qty: 3.6 ML | Refills: 0 | Status: SHIPPED | OUTPATIENT
Start: 2022-05-06 | End: 2022-05-12

## 2022-05-05 RX ADMIN — SERTRALINE HYDROCHLORIDE 100 MG: 50 TABLET ORAL at 08:05

## 2022-05-05 RX ADMIN — IPRATROPIUM BROMIDE AND ALBUTEROL SULFATE 3 ML: 2.5; .5 SOLUTION RESPIRATORY (INHALATION) at 08:05

## 2022-05-05 RX ADMIN — BACLOFEN 10 MG: 10 TABLET ORAL at 01:05

## 2022-05-05 RX ADMIN — IPRATROPIUM BROMIDE AND ALBUTEROL SULFATE 3 ML: 2.5; .5 SOLUTION RESPIRATORY (INHALATION) at 01:05

## 2022-05-05 RX ADMIN — DOCUSATE SODIUM 100 MG: 100 CAPSULE, LIQUID FILLED ORAL at 08:05

## 2022-05-05 RX ADMIN — CIPROFLOXACIN 2 DROP: 3 SOLUTION OPHTHALMIC at 01:05

## 2022-05-05 RX ADMIN — TAMSULOSIN HYDROCHLORIDE 0.4 MG: 0.4 CAPSULE ORAL at 08:05

## 2022-05-05 RX ADMIN — SODIUM CHLORIDE, PRESERVATIVE FREE 10 ML: 5 INJECTION INTRAVENOUS at 06:05

## 2022-05-05 RX ADMIN — SODIUM CHLORIDE, PRESERVATIVE FREE 10 ML: 5 INJECTION INTRAVENOUS at 05:05

## 2022-05-05 RX ADMIN — PANTOPRAZOLE SODIUM 40 MG: 40 INJECTION, POWDER, FOR SOLUTION INTRAVENOUS at 08:05

## 2022-05-05 RX ADMIN — LINEZOLID 600 MG: 600 INJECTION, SOLUTION INTRAVENOUS at 08:05

## 2022-05-05 RX ADMIN — CLINDAMYCIN PHOSPHATE 600 MG: 600 INJECTION, SOLUTION INTRAVENOUS at 12:05

## 2022-05-05 RX ADMIN — PIPERACILLIN SODIUM AND TAZOBACTAM SODIUM 4.5 G: 4; 500 INJECTION, POWDER, FOR SOLUTION INTRAVENOUS at 08:05

## 2022-05-05 RX ADMIN — HYDROCODONE BITARTRATE AND ACETAMINOPHEN 1 TABLET: 5; 325 TABLET ORAL at 08:05

## 2022-05-05 RX ADMIN — Medication 1000 UNITS: at 08:05

## 2022-05-05 RX ADMIN — OLANZAPINE 5 MG: 5 TABLET, ORALLY DISINTEGRATING ORAL at 08:05

## 2022-05-05 RX ADMIN — ASPIRIN 81 MG 81 MG: 81 TABLET ORAL at 08:05

## 2022-05-05 RX ADMIN — ENOXAPARIN SODIUM 40 MG: 100 INJECTION SUBCUTANEOUS at 08:05

## 2022-05-05 RX ADMIN — BACLOFEN 10 MG: 10 TABLET ORAL at 08:05

## 2022-05-05 RX ADMIN — PIPERACILLIN SODIUM AND TAZOBACTAM SODIUM 4.5 G: 4; 500 INJECTION, POWDER, FOR SOLUTION INTRAVENOUS at 05:05

## 2022-05-05 RX ADMIN — SODIUM CHLORIDE, PRESERVATIVE FREE 10 ML: 5 INJECTION INTRAVENOUS at 08:05

## 2022-05-05 RX ADMIN — SODIUM CHLORIDE, PRESERVATIVE FREE 10 ML: 5 INJECTION INTRAVENOUS at 12:05

## 2022-05-05 RX ADMIN — MULTIPLE VITAMINS W/ MINERALS TAB 1 TABLET: TAB at 08:05

## 2022-05-05 RX ADMIN — LEVOTHYROXINE SODIUM 150 MCG: 150 TABLET ORAL at 05:05

## 2022-05-05 RX ADMIN — HYDROCODONE BITARTRATE AND ACETAMINOPHEN 1 TABLET: 5; 325 TABLET ORAL at 01:05

## 2022-05-05 RX ADMIN — CYANOCOBALAMIN TAB 500 MCG 500 MCG: 500 TAB at 08:05

## 2022-05-05 RX ADMIN — POTASSIUM CHLORIDE 40 MEQ: 750 TABLET, EXTENDED RELEASE ORAL at 08:05

## 2022-05-05 RX ADMIN — FUROSEMIDE 20 MG: 10 INJECTION INTRAMUSCULAR; INTRAVENOUS at 08:05

## 2022-05-05 RX ADMIN — PIPERACILLIN SODIUM AND TAZOBACTAM SODIUM 4.5 G: 4; 500 INJECTION, POWDER, FOR SOLUTION INTRAVENOUS at 12:05

## 2022-05-05 RX ADMIN — BACLOFEN 10 MG: 10 TABLET ORAL at 03:05

## 2022-05-05 RX ADMIN — CIPROFLOXACIN 2 DROP: 3 SOLUTION OPHTHALMIC at 10:05

## 2022-05-05 RX ADMIN — CLOPIDOGREL BISULFATE 75 MG: 75 TABLET, FILM COATED ORAL at 08:05

## 2022-05-05 RX ADMIN — CLINDAMYCIN PHOSPHATE 600 MG: 600 INJECTION, SOLUTION INTRAVENOUS at 02:05

## 2022-05-05 RX ADMIN — CIPROFLOXACIN 2 DROP: 3 SOLUTION OPHTHALMIC at 05:05

## 2022-05-05 NOTE — ASSESSMENT & PLAN NOTE
Have  Held off on dilaudid as much as possible  Due  To resp  suppresion   Pt is  dnr         Will continue to follow along with the patient and try to balance out comfort care measures with his requirements for palliation of symptoms also says to not cause any adverse issues      Patient seen show some mild improvement in his metabolic encephalopathy in terms of his sodium and perhaps infection.  However we still have issue with his hypoxemia which is playing a factor with this as well    LIKELY HYPOXEMIA AND DEMENTIA AND MALNUTRITION ALL CONTRBUTING

## 2022-05-05 NOTE — ASSESSMENT & PLAN NOTE
Iv  Beta blocker and  Monitor  His   Fluid status     Unclear etiology overall for his heart failure.  Patient is not a candidate for intervention because of his other underlying issues such as his dementia weight loss etc...  Please see my notes from [employer] documentation    CONT ABOVE.

## 2022-05-05 NOTE — PROGRESS NOTES
[facility] - Medical Surgical Unit  Hospital Medicine  Progress Note    Patient Name: Tejas Navas  MRN: [id number]  Patient Class: IP- Inpatient   Admission Date: 4/25/2022  Length of Stay: 10 days  Attending Physician: Navin Lopez III, MD  Primary Care Provider: Navin Lopez III, MD        Subjective:     Principal Problem:Metabolic encephalopathy        HPI:  PT SHOWS LACK OF IMPROVEMENT AND SOME DECLINE IN M.S. AND THRIVE    Overview/Hospital Course:  After patient was readmitted to the hospital floor, he was on the floor last night with improving signs overnight of alertness.  This morning he was able to answer a few yes and no questions.  He recognized nauseated brother but main.  He has been getting his D5 free water intravenously and his feeds.  Patient have a midline which failed an independent in epic today.      Pt with MINIMAL RECOVERY AND PERSISTENT AMS SINCE ICU STEPDOWN. ON 2 L NC LESS. NO EATING. RARELY VERBAL WITH STAFF      No new subjective & objective note has been filed under this hospital service since the last note was generated.      Assessment/Plan:      * Metabolic encephalopathy  Have  Held off on dilaudid as much as possible  Due  To resp  suppresion   Pt is  dnr         Will continue to follow along with the patient and try to balance out comfort care measures with his requirements for palliation of symptoms also says to not cause any adverse issues      Patient seen show some mild improvement in his metabolic encephalopathy in terms of his sodium and perhaps infection.  However we still have issue with his hypoxemia which is playing a factor with this as well    LIKELY HYPOXEMIA AND DEMENTIA AND MALNUTRITION ALL CONTRBUTING    Hypokalemia    Replace   k     w  Iv and   Through  g tube    Acute on chronic systolic heart failure    Iv  Beta blocker and  Monitor  His   Fluid status     Unclear etiology overall for his heart failure.  Patient is not a candidate for  intervention because of his other underlying issues such as his dementia weight loss etc...  Please see my notes from [employer] documentation    CONT ABOVE.     Septicemic  On levaquin and zosyn .  Continue these antibiotics      Increased body temperature  Treat underlying infection     Will continue to monitor    Acute abdominal pain    Will cont  Tx  Constipation     Patient with initial constipation on exam and per CT.  This has since resolved.        VTE Risk Mitigation (From admission, onward)         Ordered     enoxaparin injection 40 mg  Daily         04/30/22 2058                Discharge Planning   ISSA:      Code Status: DNR   Is the patient medically ready for discharge?: No    Reason for patient still in hospital (select all that apply): Treatment                     Navin Lopez III, MD  Department of Hospital Medicine   [facility] - Medical Surgical Unit

## 2022-05-05 NOTE — HISTORICAL [facility]
This is a historical note converted from Deanna. Formatting and pictures may have been removed.  Please reference Deanna for original formatting and attached multimedia. History of Present Illness  61 year? old? wm? w a? hx of?? cachexia? and? a hx of?? PEG? tube. He lives? at? [facility]? nursing?? home.? he? has?? been? doing well?  until? recently?? when he? began to? have??? lack of appetite and?? a? fever.? labs were done and? he? had elevated lft and? elevated? wbc  he has? no gallbladder.? He has? been? getting? free water flushes in his PEG? tube at? night to? increase? fluids.? he has? been on macrobid? orally for his recently? dx?? uti and? he? has?? been?? getting? a?? rocephin? IM.?? he? was? sent ti? ER?? on 7/30? no imaging was? done and? he was? sent? back to [facility].? on? 7.31? was? seen in? er .?? he?? is? still? without? an appetite? and was described as lethargic? by nursing home personnel  so orders were? written for direct?? admit.??? He? is alert? and? able to answer? questions  Review of Systems  Constitutional:?no fever, +fatigue, +weakness  Eye:?no vision loss, eye redness, drainage, or pain  ENMT:?no sore throat, ear pain, sinus pain/congestion, nasal congestion/drainage  Respiratory:?no cough, no wheezing, +shortness of breath?? says? stomach feels? bloated and feels like it? is inhibiting? breahting  Cardiovascular:?no chest pain, no palpitations, no edema  Gastrointestinal:?no nausea,+ vomiting,x1 or diarrhea? mild? abd pain  Genitourinary:?no dysuria, no urinary frequency or urgency, no hematuria? does have a? uti on macrobid  Hema/Lymph:?no abnormal bruising or bleeding  Endocrine:?no heat or cold intolerance, no excessive thirst or excessive urination  Musculoskeletal:?no muscle or joint pain, no joint swelling  Integumentary:?no skin rash or abnormal lesion? very?? dry? skin  Neurologic: no headache, no dizziness, no weakness or numbness  ?  Physical Exam  Vitals &  Measurements  T:?36.6? ?C (Oral)? HR:?78(Peripheral)? RR:?18? BP:?125/79? SpO2:?99%? WT:?50.5?kg? BMI:?16.49?  General:?cachexia? but has? gained? weight  Eye: PERRLA, EOMI, clear conjunctiva, eyelids normal?? eyes? sunket  HENT:?TMs/ear canals clear, oropharynx without erythema/exudate, oropharynx and nasal mucosal surfaces moist, no maxillary/frontal sinus tenderness to palpation? microagnathia  Neck: full range of motion, no thyromegaly or lymphadenopathy  Respiratory:?clear to auscultation bilaterally? diminished? breath sounds at? bases  Cardiovascular:?regular rate and rhythm without murmurs, gallops or rubs  Gastrointestinal:?soft, non-tender, non-distended with normal bowel sounds, without masses to palpation? slighly? distended.? actually has?? some subcutatneous? fat? which is a? new? finding.? PEG? in place  ?   Genitourinary: no CVA tenderness to palpation  Musculoskeletal:?full range of motion of all extremities/spine without limitation or discomfort  Integumentary: no rashes or skin lesions present  Neurologic: cranial nerves intact, no signs of peripheral neurological deficit, motor/sensory function intact  mentation is? a bit? slow?  hx of? cva int he past  Accession:?[id number]  Order:?CT Abdomen and Pelvis W Contrast  Report Dt/Tm:?08/03/2021 20:18  Report:?  Clinical History:  Abdominal pain  ?  Technique:  Multidetector IV contrast enhanced axial CT images of the abdomen and  pelvis were obtained with coronal and sagittal reconstructions.?  ?  Automatic exposure control was utilized to reduce the patients  radiation dose.  ?  Comparison:  2/18/2021  ?  Findings:  ?  01. HEPATOBILIARY: Scattered hypodensities in the liver appear grossly  unchanged and are too small to accurately characterize. These are  statistically benign. Status post cholecystectomy.  ?  02. SPLEEN: Normal  ?  03. PANCREAS: No focal masses or ductal dilatation.  ?  04. ADRENALS: No adrenal nodules.  ?  05. KIDNEYS: No  evidence of renal obstruction. There are innumerable  bilateral cystic lesions within the kidneys with hyperdense enlarging  lesions in the right lower pole likely represents hemorrhage within  the polycystic renal cyst.  ?  06. LYMPHADENOPATHY/RETROPERITONEUM: There is no retroperitoneal  lymphadenopathy. The abdominal aorta is normal in course and caliber.?  ?  07. BOWEL: Gastric tube is noted within the body of the stomach. No  acute bowel related abnormality.  ?  08. PELVIC VISCERA: Normal. No pelvic mass.  ?  09. PELVIC LYMPH NODES: No lymphadenopathy.  ?  10. PERITONEUM/ABDOMINAL WALL: No ascites or implant.  ?  11. SKELETAL: Compression fracture of L1 is unchanged.  ?  12. LUNG BASES: The visualized lungs are unremarkable.  ?  Impression  There are innumerable bilateral cystic lesions within the kidneys with  hyperdense enlarging lesions in the right lower pole likely represents  hemorrhage within the polycystic renal cyst.  ?  Additional findings as above.  ?  ?   Labs Last 72 Hours?  ?Chemistry? Hematology/Coagulation?   Sodium Lvl:?133 mmol/L?Low (08/03/21 17:50:00) WBC:?12.4 x10(3)/mcL?High (08/03/21 17:50:00)   Potassium Lvl: 4.7 mmol/L (08/03/21 17:50:00) RBC:?3.9 x10(6)/mcL?Low (08/03/21 17:50:00)   Chloride: 98 mmol/L (08/03/21 17:50:00) Hgb:?11.2 gm/dL?Low (08/03/21 17:50:00)   CO2: 27 mmol/L (08/03/21 17:50:00) Hct:?35.2 %?Low (08/03/21 17:50:00)   Calcium Lvl: 9.2 mg/dL (08/03/21 17:50:00) Platelet: 358 x10(3)/mcL (08/03/21 17:50:00)   Glucose Lvl: 86 mg/dL (08/03/21 17:50:00) MCV: 90 fL (08/03/21 17:50:00)   BUN: 23 mg/dL (08/03/21 17:50:00) MCH: 29 pg (08/03/21 17:50:00)   Creatinine: 0.76 mg/dL (08/03/21 17:50:00) MCHC: 32 gm/dL (08/03/21 17:50:00)   Est Creat Clearance Ser: 73.83 mL/min (08/03/21 18:32:27) RDW: 13.9 % (08/03/21 17:50:00)   eGFR-AA: >60 (08/03/21 17:50:00) MPV:?10.2 fL?High (08/03/21 17:50:00)   eGFR-MATT: >60 (08/03/21 17:50:00) Abs Neut:?10 x10(3)/mcL?High (08/03/21 17:50:00)    Amylase Lvl: 30 unit/L (08/03/21 17:50:00) Neutro Auto:?80 %?High (08/03/21 17:50:00)   Bili Total: 0.4 mg/dL (08/03/21 17:50:00) Lymph Auto:?12 %?Low (08/03/21 17:50:00)   Bili Direct: 0.1 mg/dL (08/03/21 17:50:00) Mono Auto: 4 % (08/03/21 17:50:00)   Bili Indirect: 0.3 mg/dL (08/03/21 17:50:00) Eos Auto: 2 % (08/03/21 17:50:00)   AST:?118 unit/L?High (08/03/21 17:50:00) Abs Eos: 0.2 x10(3)/mcL (08/03/21 17:50:00)   ALT:?279 unit/L?High (08/03/21 17:50:00) Basophil Auto: 1 % (08/03/21 17:50:00)   Alk Phos:?176 unit/L?High (08/03/21 17:50:00) Abs Neutro:?10 x10(3)/mcL?High (08/03/21 17:50:00)   Total Protein:?7.9 gm/dL?High (08/03/21 17:50:00) Abs Lymph: 1.5 x10(3)/mcL (08/03/21 17:50:00)   Albumin Lvl:?2.6 gm/dL?Low (08/03/21 17:50:00) Abs Mono: 0.5 x10(3)/mcL (08/03/21 17:50:00)   Globulin:?5.3 gm/dL?High (08/03/21 17:50:00) Abs Baso: 0.1 x10(3)/mcL (08/03/21 17:50:00)   A/G Ratio:?0.5 ratio?Low (08/03/21 17:50:00) IG%:?1.1 %?High (08/03/21 17:50:00)   Lipase Lvl: 32 U/L (08/03/21 17:50:00) IG#:?0.14?High (08/03/21 17:50:00)   Total CK: 34 U/L (08/03/21 17:50:00) ?   CK MB: 0.3 ng/mL (08/03/21 17:50:00) ?   Troponin-I: 0.016 ng/mL (08/03/21 17:50:00) ?   ?  ?  Assessment/Plan  1.?Elevated LFTs?R79.89  ?start? zosyn and flagyl? to? cover? any? infection  ?  2.?Atrial fibrillation?I48.91  lovenox?  3.?Cerebrovascular accident (CVA) of basal ganglia?I63.81  remote? has? some?deficits?  4.?Chronic disease-related malnutrition?E46  has a? peg? hold? feedings tonight? until? we? figure?out?? what?? ct results? are  5.?Fever?R50.9  tylenol/motrin?  ?  6.?Elevated WBC count?D72.829  start? abx  blood??cx? urine?? cx  ?  7.?History of ischemic cerebrovascular accident with residual deficit?I69.30  ?cont? aspirin and plavix  ?  8.?Impaired mobility?Z74.09  PT??consult? in am  9.?Hypernatremia?E87.0  10.?Hypothyroidism?E03.9  11.?At risk of pressure sore?Z91.89  Orders:  aspirin, 81 mg, form: Tab-Chew, Oral, Daily, first  dose 08/04/21 9:00:00 CDT  bisacodyl, 10 mg, form: Supp, KS (rectal), q24hr PRN for constipation, first dose 08/03/21 19:05:00 CDT  clopidogrel, 75 mg, form: Tab, Oral, Daily, first dose 08/04/21 9:00:00 CDT  cyanocobalamin, 500 mcg, form: Tab, Oral, qAM, first dose 08/04/21 6:00:00 CDT  levothyroxine, 100 mcg, form: Tab, Oral, Daily, first dose 08/04/21 6:00:00 CDT  multivitamin with minerals, 1 tab(s), form: Tab, Oral, qAM, first dose 08/03/21 19:05:00 CDT  pantoprazole, 40 mg, form: Tab-EC, Oral, Daily, first dose 08/03/21 20:06:00 CDT  sertraline, 100 mg, form: Tab, Oral, Daily, first dose 08/04/21 9:00:00 CDT  tamsulosin, 0.4 mg, form: Cap, Oral, Daily, first dose 08/04/21 9:00:00 CDT  CT Abdomen W/O Contrast, Routine, 08/04/21 7:45:00 CDT, d64.9, r94.5, None, Ambulatory, Creatinine if needed per protocol, Rad Type, Anemia, unspecified, N/A, 08/04/21 7:45:00 CDT   Problem List/Past Medical History  Ongoing  Abnormal radionuclide bone scan  Atrial fibrillation  Cerebrovascular accident (CVA) of basal ganglia  Chronic disease-related malnutrition  Delirium  Dysphagia  History of CVA (cerebrovascular accident) without residual deficits  History of ischemic cerebrovascular accident with residual deficit  Hypercholesteremia  Hypercholesterolemia  Hypernatremia  Hypothyroidism  Inanition  Left bundle branch block (LBBB)  Malnourished  Muscular weakness  Severe malnutrition  Sudden visual loss of left eye  Historical  Cancer of connective and soft tissues of thoracic spine  Hyponatremia  Procedure/Surgical History  Biopsy Gastrointestinal (03/03/2021)  Esophagogastroduodenoscopy (03/03/2021)  Excision of Duodenum, Via Natural or Artificial Opening Endoscopic, Diagnostic (03/03/2021)  Excision of Stomach, Via Natural or Artificial Opening Endoscopic, Diagnostic (03/03/2021)  Insertion of Feeding Device into Stomach, Percutaneous Approach (03/03/2021)  PEG Tube Insertion Initial (03/03/2021)  Cystoscopy  (09/19/2019)  Cystourethroscopy, with removal of foreign body, calculus, or ureteral stent from urethra or bladder (separate procedure); simple (09/19/2019)  Extirpation of Matter from Bladder, Via Natural or Artificial Opening Endoscopic (09/19/2019)  Biopsy Gastrointestinal (08/29/2019)  Colonoscopy (08/29/2019)  Colonoscopy, flexible; diagnostic, including collection of specimen(s) by brushing or washing, when performed (separate procedure) (08/29/2019)  Esophagogastroduodenoscopy (08/29/2019)  Esophagogastroduodenoscopy, flexible, transoral; with biopsy, single or multiple (08/29/2019)  Excision of Duodenum, Via Natural or Artificial Opening Endoscopic, Diagnostic (08/29/2019)  Excision of Stomach, Pylorus, Via Natural or Artificial Opening Endoscopic, Diagnostic (08/29/2019)  Inspection of Lower Intestinal Tract, Via Natural or Artificial Opening Endoscopic (08/29/2019)  Hip replacement (2014)  Cholecystectomy;  Lymph node biopsy  Scrotal mass   Medications  Inpatient  aspirin 81 mg oral tablet, CHEWABLE, 81 mg= 1 tab(s), Oral, Daily  Dulcolax Laxative 10 mg RECTAL suppository, 10 mg= 1 supp, SD (rectal), q24hr, PRN  Flagyl 500 mg / 100 ml premix, 500 mg= 100 mL, IV Piggyback, q8hr  levothyroxine 100 mcg (0.1 mg) oral tablet, 100 mcg= 1 tab(s), Oral, Daily  Lovenox, 40 mg= 0.4 mL, Subcutaneous, Daily  MVI with Minerals (Adult Tab), 1 tab(s), Oral, qAM  Pantoprazole 40 mg ORAL EC-Tablet, 40 mg= 1 tab(s), Oral, Daily  Plavix 75 mg oral tablet, 75 mg= 1 tab(s), Oral, Daily  sertraline 50 mg oral tablet, 100 mg= 2 tab(s), Oral, Daily  Sodium Chloride 0.9% 1,000 mL, 1000 mL, IV  tamsulosin 0.4 mg oral capsule, 0.4 mg= 1 cap(s), Oral, Daily  Vitamin B-12 500 mcg oral tablet, 500 mcg= 1 tab(s), Oral, qAM  Zofran 2 mg/mL injectable solution, 4 mg= 2 mL, IV Push, q6hr, PRN  Zosyn 3.375 gm (for IVPB)  Home  alendronate 70 mg oral tablet, 70 mg= 1 tab(s), Oral, qSunday  aspirin 81 mg oral tablet, CHEWABLE, 81 mg= 1  tab(s), Oral, Daily  atorvastatin 10 mg oral tablet, 10 mg= 1 tab(s), Oral, Daily,? ?Not Taking per Prescriber  Calcium, Magnesium and Zinc oral tablet, 1 tab(s), Oral, qAM  Dulcolax Laxative 10 mg RECTAL suppository, 10 mg= 1 supp, HI (rectal), q24hr, PRN  Keflex 500 mg oral capsule, 500 mg= 1 cap(s), Oral, TID,? ?Not Taking per Prescriber  levothyroxine 100 mcg (0.1 mg) oral tablet, 100 mcg= 1 tab(s), Oral, Daily  omeprazole 40 mg oral DR capsule, 40 mg= 1 cap(s), Oral, Daily  Plavix 75 mg oral tablet, 75 mg= 1 tab(s), Oral, Daily  sertraline 50 mg oral tablet, 100 mg= 2 tab(s), Oral, Daily  tamsulosin 0.4 mg oral capsule, 0.4 mg= 1 cap(s), Oral, Daily  Tylenol 325 mg oral tablet, 650 mg= 2 tab(s), Oral, q12hr, PRN  Vitamin B-12 500 mcg oral tablet, 500 mcg= 1 tab(s), Oral, qAM  Vitamin D3 1000 intl units oral capsule, 1000 IntUnit= 1 cap(s), Oral, qAM  Allergies  codeine?(hives)  morphine?(unknown)  Social History  Abuse/Neglect  No, 08/03/2021  No, 07/30/2021  No, 03/04/2021  No, 03/03/2021  No, 02/18/2021  No, No, Yes, 09/19/2019  Alcohol  Current, Beer, 1-2 times per year, 06/12/2017  Employment/School  Disabled, 08/23/2019  Home/Environment  Lives with Alone. Living situation: Home/Independent., 08/23/2019  Sexual  Sexual orientation: Straight or heterosexual., 08/23/2019  Substance Use  Never, 06/12/2017  Tobacco  Never (less than 100 in lifetime), No, 08/03/2021  Never (less than 100 in lifetime), No, 07/30/2021  Never (less than 100 in lifetime), N/A, 03/04/2021  Never (less than 100 in lifetime), N/A, 03/03/2021  Never (less than 100 in lifetime), No, 02/18/2021  Never (less than 100 in lifetime), N/A, 09/19/2019  Family History  Congestive heart disease.: Father.  Diabetes mellitus type 2: Mother.  Hypertension.: Mother and Father.  Primary malignant neoplasm of breast: Mother.

## 2022-05-05 NOTE — PLAN OF CARE
Per Dayan piper/Alejandro BRADFORD, they will be able to do a one time contract w/[facility], if pt fly agrees.  Notified Dr. Lopez.    Also pt sister Edilia Carroll # [phone], give to Dr. Lopez.

## 2022-05-05 NOTE — HISTORICAL [facility]
This is a historical note converted from Deanna. Formatting and pictures may have been removed.  Please reference Deanna for original formatting and attached multimedia. Chief Complaint  Patient admitted to the LTAC for?continued nutrition, and?physical therapy rehabilitation?status post recovery CVA  History of Present Illness  This is a?pleasant 65-year-old gentleman who I am seeing for a number of years?has been?afflicted with?difficulty maintaining his weight.? He has been thin most of his life?as well as his father and his fathers father. ?However he presented with signs of hyponatremia secondary to a presumed basal ganglier stroke?last month?at [facility].? At that point time he had some dysphagia?and confusion which persisted for a number of days?but he slowly showed signs of mild recovery but not to the point where he could?maintain his nutrition. ?He therefore had to have a PEG tube?and is admitted here with such.? He had further work-up for malignancy work-up in terms of his bone marrow disease?which he?had earlier in his life. ?He had bone cancer that he was treated down in [facility] and [facility]?back in the 80s but I have no records of such and have tried for a number of years to piece the puzzle of this gentlemens?clinical picture together with regards to his diagnosis.? He is here because he is declined?and he is?had adult failure to thrive inanition/anorexia?in a setting of?lacking diagnosis.  ?  Please see?discharge summary from previous documentation?[facility].  Review of Systems  Mental slowness, weakness, xerosis, dysphagia,?muscular atrophy  Physical Exam  Vitals & Measurements  T:?36.6? ?C (Oral)? TMIN:?35.3? ?C (Oral)? TMAX:?36.6? ?C (Oral)? HR:?73(Monitored)? RR:?18? BP:?113/69? SpO2:?96%?  On physical exam?patient is in a state of health that has had since he has been in the hospital last 2 weeks.? He is cachectic he is thin he is?decreased mentation?but appropriate.? He  seems chronically malnourished.? Cranial nerves II through XII appear to be intact. ?He has a previous right lateral strabismus that is mild.? Nerves II through XII are intact.? Chest is clear to auscultation bilaterally regular rate and rhythm no rubs gallops or murmurs.? Abdomen soft scaphoid nontender nondistended. ?PEG tube in place with a?abdominal binder. ?No clubbing cyanosis or edema?bilateral lower extremities.? He is emaciated.? Severe muscular atrophy BMI is approximately?7  Assessment/Plan  1.?History of ischemic cerebrovascular accident with residual deficit?I69.30  ?Continues aspirin Plavix and statin.? Residual seems to be in terms of his mental affect?slowness.  2.?Malnourished?E46  ?We will continue to PEG tube feeds here and try nourish him.? Could he have an underlying?protein-losing enteropathy? ?[facility]  3.?Inanition?R64  ?Unclear etiology. ?Continue supportive care physical therapy and PEG tube feeding.  4.?At risk of pressure sore?Z91.89  ?Monitor closely.  5.?Delirium?R41.0  ?Doing well on Zyprexa  6.?Dysphagia?R13.10  ?EGD was performed by myself. ?Signs of stroke residual. ?His ST will continue to work with him  7.?Hypothyroidism?E03.9  ?Continue thyroid supplementation  8.?History of CVA (cerebrovascular accident) without residual deficits?Z86.73  ?Documentation error omit this.  9.?Muscular weakness?M62.81  ?Continue?nutritional supplementation and PT ultimately  10.?Abnormal radionuclide bone scan?R94.8  ?Patient with a spot on one of his ribs that I may consider doing a bone biopsy on?since had a history of bone cancer.? Will consider?getting one of the surgeons to undertake this  Orders:  Education General, 03/05/21 14:16:00 CST, Stop date 03/05/21 14:16:00 CST  Hepatitis Panel, AM Next collect, 03/04/21 4:56:17 CST, BLOOD, Collected, Stop date 03/04/21 4:56:00 CST, Lab Collect  LTC Additional Occupational  Therapy, 03/05/21 14:13:00 CST, 3x/Wk  LTC Additional Physical Therapy, 03/05/21 12:03:00 CST, qM-F  LTC Additional Speech Therapy, 03/08/21 8:15:00 CST, Dysphagia, 4x/Wk  NPO, 03/05/21 10:35:00 CST, NPO except for ice chips, NPO except for oral care., CM NPO  DVT prophylaxis Lovenox GI prophylaxis pantoprazole.  ?  Patient is a full code.  ?  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  Hyponatremia  Procedure/Surgical History  Biopsy Gastrointestinal (03/03/2021)  Esophagogastroduodenoscopy (03/03/2021)  Excision of Duodenum, Via Natural or Artificial Opening Endoscopic, Diagnostic (03/03/2021)  Excision of Stomach, Via Natural or Artificial Opening Endoscopic, Diagnostic (03/03/2021)  Insertion of Feeding Device into Stomach, Percutaneous Approach (03/03/2021)  PEG Tube Insertion Initial (03/03/2021)  Cystoscopy (09/19/2019)  Cystourethroscopy, with removal of foreign body, calculus, or ureteral stent from urethra or bladder (separate procedure); simple (09/19/2019)  Extirpation of Matter from Bladder, Via Natural or Artificial Opening Endoscopic (09/19/2019)  Biopsy Gastrointestinal (08/29/2019)  Colonoscopy (08/29/2019)  Colonoscopy, flexible; diagnostic, including collection of specimen(s) by brushing or washing, when performed (separate procedure) (08/29/2019)  Esophagogastroduodenoscopy (08/29/2019)  Esophagogastroduodenoscopy, flexible, transoral; with biopsy, single or multiple (08/29/2019)  Excision of Duodenum, Via Natural or Artificial Opening Endoscopic, Diagnostic (08/29/2019)  Excision of Stomach, Pylorus, Via Natural or Artificial  Opening Endoscopic, Diagnostic (08/29/2019)  Inspection of Lower Intestinal Tract, Via Natural or Artificial Opening Endoscopic (08/29/2019)  Hip replacement (2014)  Cholecystectomy;  Lymph node biopsy  Scrotal mass   Medications  Inpatient  acetaminophen 325 mg oral tablet, 650 mg= 2 tab(s), Oral, q4hr, PRN  acetaminophen 325 mg oral tablet, 650 mg= 2 tab(s), Oral, q4hr, PRN  alendronate 70 mg oral tablet, 70 mg= 1 tab(s), Oral, qSunday  aspirin 81 mg oral tablet, CHEWABLE, 81 mg= 1 tab(s), Oral, Daily  Dulcolax Laxative 10 mg RECTAL suppository, 10 mg= 1 supp, MO (rectal), q24hr, PRN  levothyroxine 75 mcg (0.075 mg) oral tablet, 75 mcg= 1 tab(s), Oral, Daily  LORAzepam, 1 mg= 0.5 mL, IM, q4hr, PRN  Lovenox, 40 mg= 0.4 mL, Subcutaneous, Daily  ondansetron 2 mg/mL injectable solution  Plavix, 75 mg= 1 tab(s), Oral, Daily  Protonix, 40 mg= 1 packet(s), PEG Tube, Daily  Sodium Chloride 0.9% Normal Saline Flush, 10 mL, IV Push, q12hr  Sodium Chloride 0.9% Normal Saline Flush, 10 mL, IV Push, BID, PRN  Zyprexa 5 mg oral tablet, 2.5 mg= 0.5 tab(s), Oral, At Bedtime  Home  alendronate 70 mg oral tablet, 70 mg= 1 tab(s), Oral, qSunday  atorvastatin 10 mg oral tablet, 10 mg= 1 tab(s), Oral, Daily  Calcium, Magnesium and Zinc oral tablet, 1 tab(s), Oral, qAM,? ?Unable to obtain  levothyroxine 75 mcg (0.075 mg) oral tablet, 75 mcg= 1 tab(s), Oral, Daily  LORazepam 2 mg/mL injectable solution, 1 mg= 0.5 mL, IM, q4hr, PRN  MVI with Minerals (Adult Tab), 1 tab(s), Oral, qAM,? ?Unable to obtain  ondansetron 2 mg/mL injectable solution, 4 mg, IV, q4hr, PRN  pantoprazole, 40 mg, IV Slow, Daily  potassium phosphate-sodium phosphate 250 mg-45 mg-298 mg oral tablet, BID  tamsulosin 0.4 mg oral capsule, 0.4 mg= 1 cap(s), Oral, Daily  Tylenol 325 mg oral tablet, 650 mg= 2 tab(s), Oral, q12hr, PRN,? ?Unable to obtain  Vitamin B-12 500 mcg oral tablet, 500 mcg= 1 tab(s), Oral, qAM,? ?Unable to obtain  Vitamin D3 1000 intl units oral  capsule, 1000 IntUnit= 1 cap(s), Oral, qAM,? ?Unable to obtain  Zyprexa 5 mg oral tablet, 2.5 mg= 0.5 tab(s), Oral, Once a day (at bedtime)  Allergies  codeine?(hives)  morphine?(unknown)  Social History  Abuse/Neglect  No, 03/04/2021  No, 03/03/2021  No, 02/18/2021  No, No, Yes, 09/19/2019  Alcohol  Current, Beer, 1-2 times per year, 06/12/2017  Employment/School  Disabled, 08/23/2019  Home/Environment  Lives with Alone. Living situation: Home/Independent., 08/23/2019  Sexual  Sexual orientation: Straight or heterosexual., 08/23/2019  Substance Use  Never, 06/12/2017  Tobacco  Never (less than 100 in lifetime), N/A, 03/04/2021  Never (less than 100 in lifetime), N/A, 03/03/2021  Never (less than 100 in lifetime), No, 02/18/2021  Never (less than 100 in lifetime), N/A, 09/19/2019  Family History  Congestive heart disease.: Father.  Diabetes mellitus type 2: Mother.  Hypertension.: Mother and Father.  Primary malignant neoplasm of breast: Mother.  Lab Results  Test Name Test Result Date/Time   Sodium Lvl 137 mmol/L 03/05/2021 04:30 CST   Potassium Lvl 3.3 mmol/L (Low) 03/05/2021 04:30 CST   Chloride 103 mmol/L 03/05/2021 04:30 CST   CO2 28 mmol/L 03/05/2021 04:30 CST   Calcium Lvl 9.4 mg/dL 03/05/2021 04:30 CST   Magnesium Lvl 2.00 mg/dL 03/05/2021 04:30 CST   Glucose Lvl 71 mg/dL (Low) 03/05/2021 04:30 CST   BUN 32.0 mg/dL (High) 03/05/2021 04:30 CST   Creatinine 0.65 mg/dL (Low) 03/05/2021 04:30 CST   Est Creat Clearance Ser 64.10 mL/min 03/05/2021 05:39 CST   eGFR-AA >60 03/05/2021 04:30 CST   eGFR-MATT >60 mls/min/1.73/m2 03/05/2021 04:30 CST   Bili Total 0.4 mg/dL 03/05/2021 04:30 CST   Bili Direct 0.2 mg/dL 03/05/2021 04:30 CST   Bili Indirect 0.20 mg/dL 03/05/2021 04:30 CST   AST 20 unit/L 03/05/2021 04:30 CST   ALT 20 unit/L 03/05/2021 04:30 CST   Alk Phos 94 unit/L 03/05/2021 04:30 CST   Total Protein 6.7 gm/dL 03/05/2021 04:30 CST   Albumin Lvl 2.9 gm/dL (Low) 03/05/2021 04:30 CST   Globulin 3.8 gm/dL (High)  03/05/2021 04:30 CST   A/G Ratio 0.8 ratio (Low) 03/05/2021 04:30 CST   Phosphorus 2.7 mg/dL 03/05/2021 04:30 CST   Prealbumin 25.2 mg/dL 03/05/2021 04:30 CST   TSH 16.3586 uIU/mL (High) 03/05/2021 04:30 CST   WBC 8.6 x10(3)/mcL 03/05/2021 04:30 CST   RBC 3.91 x10(6)/mcL (Low) 03/05/2021 04:30 CST   Hgb 12.2 gm/dL (Low) 03/05/2021 04:30 CST   Hct 35.7 % (Low) 03/05/2021 04:30 CST   Platelet 301 x10(3)/mcL 03/05/2021 04:30 CST   MCV 91 fL 03/05/2021 04:30 CST   MCH 31 pg 03/05/2021 04:30 CST   MCHC 34 gm/dL 03/05/2021 04:30 CST   RDW 12.9 % 03/05/2021 04:30 CST   MPV 9.6 fL 03/05/2021 04:30 CST   Abs Neut 6.4 x10(3)/mcL 03/05/2021 04:30 CST   Neutro Auto 75 % 03/05/2021 04:30 CST   Lymph Auto 14 % (Low) 03/05/2021 04:30 CST   Mono Auto 5 % 03/05/2021 04:30 CST   Eos Auto 5 % 03/05/2021 04:30 CST   Abs Eos 0.4 x10(3)/mcL 03/05/2021 04:30 CST   Diagnostic Results  (03/05/2021 05:29 CST XR Chest 1 View)  ?? ? ? ??  * Final Report *  ?  Reason For Exam  Congestion  ?  Radiology Report  Chest single view:  ?  INDICATION: Congestion  ?  COMPARISON STUDY: Chest x-ray of 9/22/2020  ?  FINDINGS: The lateral costophrenic angles were not included on this  study. The patient is rotated. Heart size is normal. Central pulmonary  vascularity is unremarkable.  ?  The lungs are hyperinflated. There are nonspecific fibronodular  changes in both upper lung fields, left greater than right, but there  is no dense pulmonic consolidation.  The right-sided T1 rib appears to be hypoplastic.  ?  IMPRESSION:  1. The patient is rotated.. The lateral right costophrenic angles are  not completely included on this study.  2. The lungs are hyperinflated.  3. There is nonspecific bilateral upper lung field fibronodular  changes, left greater than right, stable compared to the prior study.  4. Small right-sided T1 rib that, in the absence of surgery, is  hypoplastic.  ?  Signature Line  Electronically Signed By: Pedro Garsia MD  Date/Time Signed:  03/05/2021 07:13 [1]     [1]?XR Chest 1 View; Pedro Garsia MD 03/05/2021 05:29 CST

## 2022-05-06 VITALS
HEART RATE: 99 BPM | RESPIRATION RATE: 22 BRPM | HEIGHT: 70 IN | TEMPERATURE: 97 F | DIASTOLIC BLOOD PRESSURE: 71 MMHG | BODY MASS INDEX: 17.11 KG/M2 | SYSTOLIC BLOOD PRESSURE: 105 MMHG | WEIGHT: 119.5 LBS | OXYGEN SATURATION: 96 %

## 2022-05-06 PROCEDURE — 25000003 PHARM REV CODE 250: Performed by: INTERNAL MEDICINE

## 2022-05-06 PROCEDURE — 25000003 PHARM REV CODE 250: Performed by: FAMILY MEDICINE

## 2022-05-06 PROCEDURE — 25000003 PHARM REV CODE 250

## 2022-05-06 PROCEDURE — 94761 N-INVAS EAR/PLS OXIMETRY MLT: CPT

## 2022-05-06 PROCEDURE — C9113 INJ PANTOPRAZOLE SODIUM, VIA: HCPCS

## 2022-05-06 PROCEDURE — 27000221 HC OXYGEN, UP TO 24 HOURS

## 2022-05-06 PROCEDURE — 63600175 PHARM REV CODE 636 W HCPCS

## 2022-05-06 PROCEDURE — 94640 AIRWAY INHALATION TREATMENT: CPT

## 2022-05-06 PROCEDURE — 99900035 HC TECH TIME PER 15 MIN (STAT)

## 2022-05-06 PROCEDURE — 25000242 PHARM REV CODE 250 ALT 637 W/ HCPCS

## 2022-05-06 PROCEDURE — A4216 STERILE WATER/SALINE, 10 ML: HCPCS | Performed by: INTERNAL MEDICINE

## 2022-05-06 RX ADMIN — BACLOFEN 10 MG: 10 TABLET ORAL at 09:05

## 2022-05-06 RX ADMIN — PIPERACILLIN SODIUM AND TAZOBACTAM SODIUM 4.5 G: 4; 500 INJECTION, POWDER, FOR SOLUTION INTRAVENOUS at 05:05

## 2022-05-06 RX ADMIN — SERTRALINE HYDROCHLORIDE 100 MG: 50 TABLET ORAL at 09:05

## 2022-05-06 RX ADMIN — IPRATROPIUM BROMIDE AND ALBUTEROL SULFATE 3 ML: 2.5; .5 SOLUTION RESPIRATORY (INHALATION) at 01:05

## 2022-05-06 RX ADMIN — FUROSEMIDE 20 MG: 10 INJECTION INTRAMUSCULAR; INTRAVENOUS at 09:05

## 2022-05-06 RX ADMIN — CIPROFLOXACIN 2 DROP: 3 SOLUTION OPHTHALMIC at 02:05

## 2022-05-06 RX ADMIN — HYDROCODONE BITARTRATE AND ACETAMINOPHEN 1 TABLET: 5; 325 TABLET ORAL at 09:05

## 2022-05-06 RX ADMIN — SODIUM CHLORIDE, PRESERVATIVE FREE 10 ML: 5 INJECTION INTRAVENOUS at 12:05

## 2022-05-06 RX ADMIN — CYANOCOBALAMIN TAB 500 MCG 500 MCG: 500 TAB at 09:05

## 2022-05-06 RX ADMIN — Medication 1000 UNITS: at 09:05

## 2022-05-06 RX ADMIN — CIPROFLOXACIN 2 DROP: 3 SOLUTION OPHTHALMIC at 05:05

## 2022-05-06 RX ADMIN — IPRATROPIUM BROMIDE AND ALBUTEROL SULFATE 3 ML: 2.5; .5 SOLUTION RESPIRATORY (INHALATION) at 07:05

## 2022-05-06 RX ADMIN — LINEZOLID 600 MG: 600 INJECTION, SOLUTION INTRAVENOUS at 09:05

## 2022-05-06 RX ADMIN — PANTOPRAZOLE SODIUM 40 MG: 40 INJECTION, POWDER, FOR SOLUTION INTRAVENOUS at 09:05

## 2022-05-06 RX ADMIN — ASPIRIN 81 MG 81 MG: 81 TABLET ORAL at 09:05

## 2022-05-06 RX ADMIN — CLINDAMYCIN PHOSPHATE 600 MG: 600 INJECTION, SOLUTION INTRAVENOUS at 02:05

## 2022-05-06 RX ADMIN — ENOXAPARIN SODIUM 40 MG: 100 INJECTION SUBCUTANEOUS at 09:05

## 2022-05-06 RX ADMIN — OLANZAPINE 5 MG: 5 TABLET, ORALLY DISINTEGRATING ORAL at 09:05

## 2022-05-06 RX ADMIN — SODIUM CHLORIDE, PRESERVATIVE FREE 10 ML: 5 INJECTION INTRAVENOUS at 06:05

## 2022-05-06 RX ADMIN — MULTIPLE VITAMINS W/ MINERALS TAB 1 TABLET: TAB at 09:05

## 2022-05-06 RX ADMIN — DOCUSATE SODIUM 100 MG: 100 CAPSULE, LIQUID FILLED ORAL at 09:05

## 2022-05-06 RX ADMIN — IPRATROPIUM BROMIDE AND ALBUTEROL SULFATE 3 ML: 2.5; .5 SOLUTION RESPIRATORY (INHALATION) at 02:05

## 2022-05-06 RX ADMIN — CLOPIDOGREL BISULFATE 75 MG: 75 TABLET, FILM COATED ORAL at 09:05

## 2022-05-06 RX ADMIN — TAMSULOSIN HYDROCHLORIDE 0.4 MG: 0.4 CAPSULE ORAL at 09:05

## 2022-05-06 RX ADMIN — POTASSIUM CHLORIDE 40 MEQ: 750 TABLET, EXTENDED RELEASE ORAL at 09:05

## 2022-05-06 NOTE — PLAN OF CARE
D/c order and last day of notes sent to [facility] for pt to dc back today w/[facility].

## 2022-05-06 NOTE — PLAN OF CARE
Alonso Krishnamurthy w/[facility] to let me know when everything is arranged w/[facility] so we can d/c pt.

## 2022-05-06 NOTE — DISCHARGE INSTRUCTIONS
Dc back to [facility] to admit to their services.

## 2022-05-06 NOTE — PLAN OF CARE
Justino hampton and Dr. Lopez met and berta agreed on [facility] at [facility].  Referral sent to [facility] and spoke to Yessy.

## 2022-05-06 NOTE — PLAN OF CARE
Per Yessy piper/[facility], she messaged me that they do not have [facility]port and that she needs me to hard fax referral.  Referral faxed to her to # [phone]

## 2022-05-06 NOTE — DISCHARGE SUMMARY
[facility] - Medical Surgical Unit  General Surgery  Discharge Summary      Patient Name: Tejas Navas  MRN: [id number]  Admission Date: 4/25/2022  Hospital Length of Stay: 11 days  Discharge Date and Time: No discharge date for patient encounter.  Attending Physician: Navin Temple III, MD   Discharging Provider: Navin Temple III, MD  Primary Care Provider: Navin Temple III, MD    HPI:   No notes on file    * No surgery found *      Indwelling Lines/Drains at time of discharge:   Lines/Drains/Airways       Peripherally Inserted Central Catheter Line  Duration             PICC Double Lumen 05/03/22 1655 right basilic 2 days              Drain  Duration                  Gastrostomy/Enterostomy LUQ feeding -- days         Urethral Catheter 04/30/22 1430 Straight-tip 16 Fr. 5 days                  Hospital Course: No notes on file    Goals of Care Treatment Preferences:  Code Status: DNR          What is most important right now is to focus on symptom/pain control.  Accordingly, we have decided that the best plan to meet the patient's goals includes continuing with treatment.      Consults:   Consults (From admission, onward)          Status Ordering Provider     Inpatient consult to Registered Dietitian/Nutritionist  Once        Provider:  (Not yet assigned)    Completed NAVIN TEMPLE III            Significant Diag studies      Pending Diagnostic Studies:       Procedure Component Value Units Date/Time    CBC Auto Differential [[id number]]     Order Status: Sent Lab Status: No result     Specimen: Blood     Narrative:      The following orders were created for panel order CBC Auto Differential.  Procedure                               Abnormality         Status                     ---------                               -----------         ------                     CBC with Differential[[id number]]                                                         Please view results for these tests  on the individual orders.    CBC with Differential [[id number]]     Order Status: Sent Lab Status: No result     Specimen: Blood     Comprehensive Metabolic Panel [[id number]]     Order Status: Sent Lab Status: No result     Specimen: Blood     Magnesium [[id number]]     Order Status: Sent Lab Status: No result     Specimen: Blood     Phosphorus [[id number]]     Order Status: Sent Lab Status: No result     Specimen: Blood           Final Active Diagnoses:    Diagnosis Date Noted POA    PRINCIPAL PROBLEM:  Dementia with behavioral disturbance [F03.91] 05/05/2022 Yes     Chronic    Metabolic encephalopathy [G93.41] 05/01/2022 Yes     Chronic    Acute on chronic systolic heart failure [I50.23] 05/01/2022 Yes     Chronic      Problems Resolved During this Admission:    Diagnosis Date Noted Date Resolved POA    Acute abdominal pain [R10.9] 05/01/2022 05/05/2022 Yes    Increased body temperature [R50.9] 05/01/2022 05/05/2022 Yes    Septicemic [A41.9] 05/01/2022 05/05/2022 Yes    Hypokalemia [E87.6] 05/01/2022 05/05/2022 Yes      Discharged Condition: stable    Disposition: Hospice/Home    Follow Up:    Patient Instructions:   No discharge procedures on file.  Medications:  Reconciled Home Medications:      Medication List        START taking these medications      albuterol-ipratropium 2.5 mg-0.5 mg/3 mL nebulizer solution  Commonly known as: DUO-NEB  Take 3 mLs by nebulization every 6 (six) hours. Rescue     ciprofloxacin HCl 0.3 % ophthalmic solution  Commonly known as: CILOXAN  Place 2 drops into both eyes every 4 (four) hours. for 6 days     HYDROcodone-acetaminophen 5-325 mg per tablet  Commonly known as: NORCO  1 tablet by Per G Tube route every 6 (six) hours while awake. for 7 days     olanzapine zydis 5 MG Tbdl  Commonly known as: ZyPREXA  Take 1 tablet (5 mg total) by mouth 2 (two) times a day.            CHANGE how you take these medications      baclofen 10 MG tablet  Commonly known as: LIORESAL  1 tablet (10 mg  total) by Per G Tube route 3 (three) times daily.  What changed: how to take this            CONTINUE taking these medications      levothyroxine 125 MCG tablet  Commonly known as: SYNTHROID  Take 125 mcg by mouth before breakfast.     multivitamin capsule  Take 1 capsule by mouth once daily.     omeprazole 40 MG capsule  Commonly known as: PRILOSEC  Take 40 mg by mouth once daily.     sertraline 100 MG tablet  Commonly known as: ZOLOFT  Take 100 mg by mouth once daily.            STOP taking these medications      acetaminophen 325 MG tablet  Commonly known as: TYLENOL     alendronate 70 MG tablet  Commonly known as: FOSAMAX     aspirin 81 MG Chew     bisacodyL 10 mg Supp  Commonly known as: DULCOLAX     bisacodyL 10 mg/30 mL Enem  Commonly known as: FLEET     cholecalciferol (vitamin D3) 25 mcg (1,000 unit) capsule  Commonly known as: VITAMIN D3     clopidogreL 75 mg tablet  Commonly known as: PLAVIX     docusate sodium 100 MG capsule  Commonly known as: COLACE     methyl salicylate liquid     nitrofurantoin (macrocrystal-monohydrate) 100 MG capsule  Commonly known as: MACROBID     tamsulosin 0.4 mg Cap  Commonly known as: FLOMAX     VITAMIN B-12 500 MCG tablet  Generic drug: cyanocobalamin            Time spent on the discharge of patient: 25 minutes    Navin Lopez III, MD  General Surgery  [facility] - Medical Surgical Unit

## 2022-05-06 NOTE — PROGRESS NOTES
[facility]                     [address] [address]    PATIENT NAME:       GUANAKO FORBES   YOB: 1960  CSN:                [id number]   MRN:                [id number]  ADMIT DATE:         04/25/2022 10:18:00  PHYSICIAN:          aNvin Lopez III, MD                            PROGRESS NOTE    DATE:  05/05/2022 00:00:00    The patient is without any signs of meaningful recovery despite IV fluids and   antibiotics.  He is showing some relative stable but necessary requirements of   FiO2 of 2 L.    Upon engaging, he does not follow any commands or consistently show any signs of   meaningful communication.    PHYSICAL EXAMINATION:  VITAL SIGNS:  His systolics have been in the 110's-130's/60's-80's.  His pulses   have been in the 80's to low 100's.  Respirations have been in the 20s.  Urine   output has been 1500 out.  GENERAL:  He is alert and oriented x0.    HEENT:  Cranial nerves 2-12 appear to be grossly intact.  He has bilateral   conjunctival redness.    NECK:  He has no JVD.    LUNGS:  He does seem to have difficulty clearing his airway at times.  He has   crackles on the right side, less on the left.    CARDIOVASCULAR: He has regular rate and rhythm.  No rubs, gallops, or murmurs.    ABDOMEN:  Soft, nontender, nondistended.  PEG tube with tube feeds running.    EXTREMITIES: No clubbing or cyanosis.  Bilateral lower extremities with   bilateral contractures, severe pain with range of motion of the hips and knees.    LABORATORY DATA:  Sodium level today shows a sodium 141, potassium 4.7, chloride   107, CO2 29, BUN and creatinine are 15 and 0.84, glucose 100, magnesium 2.1.    White count 12.5, H and H of 10.7 and 34.6, platelets 353.    ASSESSMENT AND PLAN:  A 62-year-old white male with:   1. Metabolic encephalopathy G93.41.  2. Acute on chronic systolic heart failure I50.23.    3. Abdominal pain R10.9  with resolution.   4. Hyperkalemia.  5. Dementia with behavioral disturbance F03.91.  I had a lengthy discussion today with the family AND  power of  Volodymyr Navas.  Despite our goals of care and trying to restore the patient's   baseline, I think that the patient's infection and issues with respiratory   compromise and his overall decline from a cognitive but also nutritional   standpoint have really set him appropriately in the context for hospice.  The   family had been by this week, AND they stated to me and they felt the same way.    On that basis, will plan for transfer tomorrow back to the nursing home with   hospice.  I am going to coordinate with , Mel, to coordinate this   and contact [facility] and will proceed from there.        ______________________________  MD KEVON Bergman III/RORY  DD:  05/05/2022  Time:  11:50PM  DT:  05/06/2022  Time:  12:49AM  Job #:  [id number]      PROGRESS NOTE

## 2022-05-07 NOTE — DISCHARGE SUMMARY
[facility]                     [address] [facility] [address] [facility] [address]    PATIENT NAME:       GUANAKO FORBES   YOB: 1960  CSN:                [id number]   MRN:                [id number]  ADMIT DATE:         04/25/2022 10:18:00  PHYSICIAN:          Navin Lopez III, MD                          DISCHARGE SUMMARY    DATE OF DISCHARGE:  05/06/2022 18:20:00    ADMISSION DIAGNOSES:    1. Metabolic encephalopathy.  2. Hypernatremia.  3. Abdominal pain.  4. Hyperkalemia.    5. Dementia with behavioral disturbance.    DISCHARGE DIAGNOSES:    1. Metabolic encephalopathy, G93.41.  2. Acute on chronic systolic heart failure, I50.2.  3. Resolution of abdominal pain.  4. Dementia with behavioral disturbance, F03.91.    HOSPITAL COURSE:  The patient is an unfortunate gentleman I have had for about 8   years of my practice who is 62 years old, who presented with a questionable   aspiration pneumonia, a UTI as well.  Was placed on antibiotics and placed in   the ICU for a couple of days.  He also had some hypernatremia because he had a   PEG tube and was not getting enough free water.  He was more behavioral in the   ICU and showing some respiratory decompensation with O2 requiring 6 L of oxygen.    Ultimately, we discussed the case with his family.  The patient was showing   significant signs of continued hypoxemia, altered mental status.  Despite   antibiotics and appropriate care, the patient showed a continued decline.    We held his feeds for a couple days and I was actually able to diurese him just   a touch to get him where he was back on room air.  His cognition has still not   improved.  Dementia is progressive.  I discussed the case at length with his   brother and sister, who are both power of , Volodymyr and Vicky respectively,   and they both agreed hospice was reasonable considering the extensive course,   the patient's  precipitous and lengthy decline in the last couple years.    We decided to consult [facility] and we will send him to the [facility]   and they will coordinate his care.  I have coordinated with those nurses   already.      Will be continuing him on his DuoNeb, baclofen, ciprofloxacin for his eyes,   hydrocodone p.r.n., levothyroxine 125, Zyprexa p.r.n. at bedtime, omeprazole,   and sertraline.    He is going back to [facility] the [facility] home.  Will continue to follow up   with them.        ______________________________  MD KEVON Bergman III/RORY  DD:  05/06/2022  Time:  10:26PM  DT:  05/07/2022  Time:  09:19AM  Job #:  [id number]      DISCHARGE SUMMARY

## 2022-05-12 NOTE — PHYSICIAN QUERY
"PT Name: Tejas Navas  MR #: [id number]     DOCUMENTATION CLARIFICATION     CDS: Carol Mary RN, CCDS  Contact information: [email or web address]  This form is a permanent document in the medical record.     Query Date: May 12, 2022    By submitting this query, we are merely seeking further clarification of documentation.  Please utilize your independent clinical judgment when addressing the question(s) below.  The Medical Record contains the following:  Indicators Supporting Clinical Findings Location in Medical Record   x HR         RR          BP        Temp  H&P 4/25   x Lactic Acid          Procalcitonin 4/25/22 12:10  Lactic acid 1.6 H&P 4/25   x WBC           Bands          CRP  4/25/22 08:05  WBC 13.7 H&P 4/25    Culture(s)      AMS, Confusion, LOC, etc.      Organ Dysfunction/Failure     x Bacteremia or Sepsis / Septic Septicemic  On levaquin and zosyn .  Continue these antibiotics PN 5/4/22   x Known or Suspected Source of Infection documented "Fever source likely related to this point aspiration pneumonia. Other possible sources ruled out. Urine and blood cultures."    "The patient is an unfortunate gentleman I have had for about 8  years of my practice who is 62 years old, who presented with a questionable aspiration pneumonia, a UTI as well.  Was placed on antibiotics and placed in the ICU for a couple of days. " PN 4/27       Dc summary 5/6    (Failed) Outpatient Treatment      Medication      Treatment     x Other  CXR pulled in from PN        Provider, please clarify the diagnosis, after study, associated with the above clinical findings.    [   x] Sepsis due suspected or known aspiration pneumonia     [   ] Sepsis due to other (suspected) organism (please specify): ________________     [   ] Suspected or known aspiration pneumonia without Sepsis     [   ] Other (please specify): __________     [  ] Clinically Undetermined     Please document in your progress notes daily for the duration of " treatment until resolved and include in your discharge summary.

## 2022-05-12 NOTE — PHYSICIAN QUERY
PT Name: Tejas Navas  MR #: [id number]     DOCUMENTATION CLARIFICATION     CDS: Carol Mary RN, CCDS  Contact information: [email or web address]  This form is a permanent document in the medical record.     Query Date: May 12, 2022    By submitting this query, we are merely seeking further clarification of documentation.  Please utilize your independent clinical judgment when addressing the question(s) below.  The Medical Record contains the following   Indicators   Supporting Clinical Findings Location in Medical Record   x SOB, BARRIOS, Wheezing, Productive Cough, Use of Accessory Muscles, etc. Subjective:  Saw the patient early this morning for 7:00. Patient seems to be less responsive he seemed to be in mild respiratory distress he was hypoxemic on 4L. Give him some Lasix to see if he would improve showed minimal improvement he was more arousable but he was breathing in the 30s most of the night. O2 sats improved with diuresis. Out of abundance of caution and deteiroation move the patient to the ICU where he is remained confused delirious and pulling and trying to take off his clothes and typical behavior of delirium.  His BNP was significantly elevated 1200.      Chief Complaint:  Pt w very labored breathing this am  W a very high co2 on abg  Given Narcan and he began to have less respiratory distress  His chest xray is more consolidated and his ef is low    He was more behavioral in the ICU and showing some respiratory decompensation with O2 requiring 6 L of oxygen. PN 4/29                          PN 4/30  Walker          Dc summary 5/6     x RR         ABGs         O2 sat 4/30 0750  Pco2  81.4   Po2  110   15LPM oxymask Labs (scan on media tab)   x Hypoxia/Hypercapnia Ultimately, we discussed the case with his family.  The patient was showing significant signs of continued hypoxemia, altered mental status.   Dc summary 5/6/22    BiPAP/Intubation/Mechanical Ventilation     x Supplemental O2 He was more  behavioral in the   ICU and showing some respiratory decompensation with O2 requiring 6 L of oxygen. DC summary 5/6/22    Home O2, Oxygen Dependence      Respiratory distress or failure      Radiology findings     x Acute/Chronic Illness DISCHARGE DIAGNOSES:    1. Metabolic encephalopathy, G93.41.  2. Acute on chronic systolic heart failure, I50.2.  3. Resolution of abdominal pain.  4. Dementia with behavioral disturbance, F03.91. Dc summary 5/6    Treatment      Other         The noted clinical guidelines are only system guidelines and do not replace the providers clinical judgment.    Provider, please specify the diagnosis or diagnoses associated with above clinical findings.   [   x ] Acute Respiratory Failure with Hypoxia and Hypercapnia - Hypoxia: ABG pO2 < 60 mmHg or O2 sat of <91% on room air and Hypercapnia: pCO2 > 50 mmHg with pH < 7.35 and respiratory symptoms documented   [    ] Acute Respiratory Distress - Generally describes less severe respiratory symptoms (tachypnea, in respiratory distress, increased work of breathing, unable to speak in complete sentences, labored breathing, use of accessory muscles, RR> 24, cyanosis, dyspnea, wheezing, stridor, lethargy) without sufficient measurements (pO2, SpO2, pH, and pCO2) to meet criteria for respiratory failure   [    ] Other Respiratory Diagnosis (please specify): _________________   [   ] Clinically Undetermined       Please document in your progress notes daily for the duration of treatment until resolved and include in your discharge summary.     Reference:    TREMAINE Jaffe MD. (2020, March 13). Acute respiratory distress syndrome: Clinical features, diagnosis, and complications in adults ([id number] BA Bennett MD & [id number] AILYN Solano MD, Eds.). Retrieved November 13, 2020, from  [email or web address]&source=search_[email or web address]&usage_type=default&display_rank=1  Form No. [id number]